# Patient Record
Sex: MALE | Race: WHITE | NOT HISPANIC OR LATINO | Employment: FULL TIME | URBAN - METROPOLITAN AREA
[De-identification: names, ages, dates, MRNs, and addresses within clinical notes are randomized per-mention and may not be internally consistent; named-entity substitution may affect disease eponyms.]

---

## 2021-10-06 NOTE — PROGRESS NOTES
Chief Complaint        Hemorrhoids    History of Present Illness      Julio Vines is a 56 y.o. male who presents to [facility] as a new patient as a referral from [facility].  Patient has a history of hemorrhoids, family history of colon cancer and anemia.  Patient reports he has struggled with hemorrhoids for over 15 years he did have cauterization of his hemorrhoids about 13 years ago he reports.  He has hemorrhoids returned about 8 years ago they are external causing irritation as well as internal.  He reports bleeding with bowel movements.  Patient reports 1 time of bleeding into the toilet bowl about 4 months ago.  He uses baby wipes to avoid irritation.  He reports 1 bowel movement a day that is soft in texture.  He has hydrating well.  Patient drinks alcohol about 4 beers a day.  He has family history of colon cancer in his sister that was diagnosed at age 47 and had to undergo surgery and chemo she is in remission now at age 49.  Patient presents today with the complaint of hemorrhoids.  Patient was noted to be anemic over the past year.  Patient denies fever, nausea, vomiting, weight loss, night sweats, melena, hematemesis.    Patient has never had an EGD.   Patient's last colonoscopy was in 2017 at [facility] with Dr. Gamboa for screening colonoscopy normal colon.       Results       Result Review :   The following data was reviewed by: Yajaira Olmstead NP on 10/07/2021         CBC    CBC 11/18/20 5/18/21   WBC 8.93 8.30   RBC 4.05 (A) 3.90 (A)   Hemoglobin 13.7 13.3 (A)   Hematocrit 40.7 (A) 38.9 (A)   .5 (A) 99.7   MCH 33.8 34.1 (A)   MCHC 33.7 34.2   RDW 13.1 13.1   Platelets 345 348   (A) Abnormal value                   Past Medical History       Past Medical History:   Diagnosis Date   • HBP (high blood pressure)        Past Surgical History:   Procedure Laterality Date   • COLONOSCOPY  2017    [facility]         Current  "Outpatient Medications:   •  ferrous sulfate 325 (65 FE) MG tablet, Take 325 mg by mouth Daily With Breakfast., Disp: , Rfl:   •  Lysine 1000 MG tablet, Take  by mouth., Disp: , Rfl:   •  multivitamin with minerals tablet tablet, Take 1 tablet by mouth Daily., Disp: , Rfl:   •  olmesartan-hydrochlorothiazide (BENICAR HCT) 40-12.5 MG per tablet, Take 1 tablet by mouth Daily., Disp: , Rfl:   •  Omega-3 Fatty Acids (fish oil) 1000 MG capsule capsule, Take 1 capsule by mouth Daily., Disp: , Rfl:   •  Potassium 75 MG tablet, Take  by mouth., Disp: , Rfl:   •  hydrocortisone (ANUSOL-HC) 25 MG suppository, Insert 1 suppository into the rectum 2 (Two) Times a Day., Disp: 24 suppository, Rfl: 2  •  polyethylene glycol (GoLYTELY) 236 g solution, Instructions provided by the office. Colon prep., Disp: 4000 mL, Rfl: 0     No Known Allergies    Family History   Problem Relation Age of Onset   • Colon cancer Sister         Social History     Social History Narrative   • Not on file       Objective       Objective     Vital Signs:   /85 (BP Location: Right arm, Patient Position: Sitting, Cuff Size: Adult)   Pulse 78   Ht 180.3 cm (71\")   Wt 79 kg (174 lb 3.2 oz)   SpO2 100%   BMI 24.30 kg/m²     Body mass index is 24.3 kg/m².    Physical Exam  Constitutional:       General: He is not in acute distress.     Appearance: Normal appearance. He is well-developed and normal weight.   Eyes:      Conjunctiva/sclera: Conjunctivae normal.      Pupils: Pupils are equal, round, and reactive to light.      Visual Fields: Right eye visual fields normal and left eye visual fields normal.   Cardiovascular:      Rate and Rhythm: Normal rate and regular rhythm.      Heart sounds: Normal heart sounds.   Pulmonary:      Effort: Pulmonary effort is normal. No retractions.      Breath sounds: Normal breath sounds and air entry.      Comments: Inspection of chest: normal appearance  Abdominal:      General: Bowel sounds are normal.      " Palpations: Abdomen is soft.      Tenderness: There is no abdominal tenderness.      Comments: No appreciable hepatosplenomegaly   Musculoskeletal:      Cervical back: Neck supple.      Right lower leg: No edema.      Left lower leg: No edema.   Lymphadenopathy:      Cervical: No cervical adenopathy.   Skin:     Findings: No lesion.      Comments: Turgor normal   Neurological:      Mental Status: He is alert and oriented to person, place, and time.   Psychiatric:         Mood and Affect: Mood and affect normal.              Assessment & Plan          Assessment and Plan    Diagnoses and all orders for this visit:    1. Hemorrhoids, unspecified hemorrhoid type (Primary)  -     CBC & Differential  -     Iron Profile  -     Vitamin B12 & Folate    2. Rectal bleeding  -     CBC & Differential  -     Iron Profile  -     Vitamin B12 & Folate    3. Family history of colon cancer  -     CBC & Differential  -     Iron Profile  -     Vitamin B12 & Folate    4. Anemia, unspecified type  -     CBC & Differential  -     Iron Profile  -     Vitamin B12 & Folate    5. ETOH abuse  -     CBC & Differential  -     Iron Profile  -     Vitamin B12 & Folate    6. Pre-procedural examination  -     COVID PRE-OP / PRE-PROCEDURE SCREENING ORDER (NO ISOLATION) - Swab, Nasopharynx; Future    Other orders  -     hydrocortisone (ANUSOL-HC) 25 MG suppository; Insert 1 suppository into the rectum 2 (Two) Times a Day.  Dispense: 24 suppository; Refill: 2    56-year-old male presenting to the office today with a history of anemia, rectal bleeding, hemorrhoids, rectal bleeding, alcohol use and family history of colon cancer in his sister at age 47.  The patient symptoms of rectal bleeding, anemia hemorrhoids and family history I have recommended that the patient undergo further evaluation with a colonoscopy.  I have discussed this procedure in detail with the patient.  I have discussed the risks, benefits and alternatives.  I have discussed the risk  of anesthesia, bleeding and perforation.  Patient understands these risks, benefits and alternatives and wishes to proceed.  I will schedule him at his earliest convenience.  I have prescribed Anusol HC suppositories to see if this helps with his hemorrhoids and rectal bleeding.  I have recommended patient discontinue alcohol use.  I have ordered labs to include CBC, iron, vitamin B12 and folate.  Patient is agreeable to this plan and will follow up after endoscopy.              Follow Up       Follow Up   Return for Follow up after endoscopy in office.  Patient was given instructions and counseling regarding his condition or for health maintenance advice. Please see specific information pulled into the AVS if appropriate.

## 2021-10-07 ENCOUNTER — LAB (OUTPATIENT)
Dept: LAB | Facility: HOSPITAL | Age: 56
End: 2021-10-07

## 2021-10-07 ENCOUNTER — OFFICE VISIT (OUTPATIENT)
Dept: GASTROENTEROLOGY | Facility: CLINIC | Age: 56
End: 2021-10-07

## 2021-10-07 ENCOUNTER — PREP FOR SURGERY (OUTPATIENT)
Dept: OTHER | Facility: HOSPITAL | Age: 56
End: 2021-10-07

## 2021-10-07 VITALS
WEIGHT: 174.2 LBS | DIASTOLIC BLOOD PRESSURE: 85 MMHG | OXYGEN SATURATION: 100 % | SYSTOLIC BLOOD PRESSURE: 150 MMHG | BODY MASS INDEX: 24.39 KG/M2 | HEIGHT: 71 IN | HEART RATE: 78 BPM

## 2021-10-07 DIAGNOSIS — K64.9 HEMORRHOIDS: Primary | ICD-10-CM

## 2021-10-07 DIAGNOSIS — K62.5 RECTAL BLEEDING: ICD-10-CM

## 2021-10-07 DIAGNOSIS — Z80.0 FAMILY HISTORY OF COLON CANCER: ICD-10-CM

## 2021-10-07 DIAGNOSIS — Z01.818 PRE-PROCEDURAL EXAMINATION: ICD-10-CM

## 2021-10-07 DIAGNOSIS — F10.10 ETOH ABUSE: ICD-10-CM

## 2021-10-07 DIAGNOSIS — D64.9 ANEMIA, UNSPECIFIED TYPE: ICD-10-CM

## 2021-10-07 DIAGNOSIS — K64.9 HEMORRHOIDS, UNSPECIFIED HEMORRHOID TYPE: Primary | ICD-10-CM

## 2021-10-07 LAB
BASOPHILS # BLD AUTO: 0.13 10*3/MM3 (ref 0–0.2)
BASOPHILS NFR BLD AUTO: 2.1 % (ref 0–1.5)
DEPRECATED RDW RBC AUTO: 50.3 FL (ref 37–54)
EOSINOPHIL # BLD AUTO: 0.18 10*3/MM3 (ref 0–0.4)
EOSINOPHIL NFR BLD AUTO: 2.9 % (ref 0.3–6.2)
ERYTHROCYTE [DISTWIDTH] IN BLOOD BY AUTOMATED COUNT: 13 % (ref 12.3–15.4)
FOLATE SERPL-MCNC: 15.5 NG/ML (ref 4.78–24.2)
HCT VFR BLD AUTO: 42.8 % (ref 37.5–51)
HGB BLD-MCNC: 14.3 G/DL (ref 13–17.7)
IMM GRANULOCYTES # BLD AUTO: 0.01 10*3/MM3 (ref 0–0.05)
IMM GRANULOCYTES NFR BLD AUTO: 0.2 % (ref 0–0.5)
IRON 24H UR-MRATE: 187 MCG/DL (ref 59–158)
IRON SATN MFR SERPL: 56 % (ref 20–50)
LYMPHOCYTES # BLD AUTO: 1.61 10*3/MM3 (ref 0.7–3.1)
LYMPHOCYTES NFR BLD AUTO: 25.7 % (ref 19.6–45.3)
MCH RBC QN AUTO: 34.8 PG (ref 26.6–33)
MCHC RBC AUTO-ENTMCNC: 33.4 G/DL (ref 31.5–35.7)
MCV RBC AUTO: 104.1 FL (ref 79–97)
MONOCYTES # BLD AUTO: 0.81 10*3/MM3 (ref 0.1–0.9)
MONOCYTES NFR BLD AUTO: 12.9 % (ref 5–12)
NEUTROPHILS NFR BLD AUTO: 3.52 10*3/MM3 (ref 1.7–7)
NEUTROPHILS NFR BLD AUTO: 56.2 % (ref 42.7–76)
NRBC BLD AUTO-RTO: 0 /100 WBC (ref 0–0.2)
PLATELET # BLD AUTO: 334 10*3/MM3 (ref 140–450)
PMV BLD AUTO: 9.9 FL (ref 6–12)
RBC # BLD AUTO: 4.11 10*6/MM3 (ref 4.14–5.8)
TIBC SERPL-MCNC: 332 MCG/DL (ref 298–536)
TRANSFERRIN SERPL-MCNC: 223 MG/DL (ref 200–360)
VIT B12 BLD-MCNC: 170 PG/ML (ref 211–946)
WBC # BLD AUTO: 6.26 10*3/MM3 (ref 3.4–10.8)

## 2021-10-07 PROCEDURE — 99204 OFFICE O/P NEW MOD 45 MIN: CPT | Performed by: NURSE PRACTITIONER

## 2021-10-07 PROCEDURE — 85025 COMPLETE CBC W/AUTO DIFF WBC: CPT | Performed by: NURSE PRACTITIONER

## 2021-10-07 PROCEDURE — 82607 VITAMIN B-12: CPT | Performed by: NURSE PRACTITIONER

## 2021-10-07 PROCEDURE — 84466 ASSAY OF TRANSFERRIN: CPT | Performed by: NURSE PRACTITIONER

## 2021-10-07 PROCEDURE — 82746 ASSAY OF FOLIC ACID SERUM: CPT | Performed by: NURSE PRACTITIONER

## 2021-10-07 PROCEDURE — 83540 ASSAY OF IRON: CPT | Performed by: NURSE PRACTITIONER

## 2021-10-07 RX ORDER — FERROUS SULFATE 325(65) MG
325 TABLET ORAL
COMMUNITY

## 2021-10-07 RX ORDER — MULTIPLE VITAMINS W/ MINERALS TAB 9MG-400MCG
1 TAB ORAL DAILY
Status: ON HOLD | COMMUNITY
End: 2022-12-19

## 2021-10-07 RX ORDER — HYDROCORTISONE ACETATE 25 MG/1
25 SUPPOSITORY RECTAL 2 TIMES DAILY
Qty: 24 SUPPOSITORY | Refills: 2 | Status: SHIPPED | OUTPATIENT
Start: 2021-10-07

## 2021-10-07 RX ORDER — CHLORAL HYDRATE 500 MG
1 CAPSULE ORAL DAILY
COMMUNITY

## 2021-10-07 RX ORDER — OLMESARTAN MEDOXOMIL AND HYDROCHLOROTHIAZIDE 40/12.5 40; 12.5 MG/1; MG/1
1 TABLET ORAL DAILY
COMMUNITY
Start: 2021-08-06

## 2021-10-07 NOTE — PATIENT INSTRUCTIONS
Hemorrhoids  Hemorrhoids are swollen veins in and around the rectum or anus. There are two types of hemorrhoids:  · Internal hemorrhoids. These occur in the veins that are just inside the rectum. They may poke through to the outside and become irritated and painful.  · External hemorrhoids. These occur in the veins that are outside the anus and can be felt as a painful swelling or hard lump near the anus.  Most hemorrhoids do not cause serious problems, and they can be managed with home treatments such as diet and lifestyle changes. If home treatments do not help the symptoms, procedures can be done to shrink or remove the hemorrhoids.  What are the causes?  This condition is caused by increased pressure in the anal area. This pressure may result from various things, including:  · Constipation.  · Straining to have a bowel movement.  · Diarrhea.  · Pregnancy.  · Obesity.  · Sitting for long periods of time.  · Heavy lifting or other activity that causes you to strain.  · Anal sex.  · Riding a bike for a long period of time.  What are the signs or symptoms?  Symptoms of this condition include:  · Pain.  · Anal itching or irritation.  · Rectal bleeding.  · Leakage of stool (feces).  · Anal swelling.  · One or more lumps around the anus.  How is this diagnosed?  This condition can often be diagnosed through a visual exam. Other exams or tests may also be done, such as:  · An exam that involves feeling the rectal area with a gloved hand (digital rectal exam).  · An exam of the anal canal that is done using a small tube (anoscope).  · A blood test, if you have lost a significant amount of blood.  · A test to look inside the colon using a flexible tube with a camera on the end (sigmoidoscopy or colonoscopy).  How is this treated?  This condition can usually be treated at home. However, various procedures may be done if dietary changes, lifestyle changes, and other home treatments do not help your symptoms. These  procedures can help make the hemorrhoids smaller or remove them completely. Some of these procedures involve surgery, and others do not. Common procedures include:  · Rubber band ligation. Rubber bands are placed at the base of the hemorrhoids to cut off their blood supply.  · Sclerotherapy. Medicine is injected into the hemorrhoids to shrink them.  · Infrared coagulation. A type of light energy is used to get rid of the hemorrhoids.  · Hemorrhoidectomy surgery. The hemorrhoids are surgically removed, and the veins that supply them are tied off.  · Stapled hemorrhoidopexy surgery. The surgeon staples the base of the hemorrhoid to the rectal wall.  Follow these instructions at home:  Eating and drinking    · Eat foods that have a lot of fiber in them, such as whole grains, beans, nuts, fruits, and vegetables.  · Ask your health care provider about taking products that have added fiber (fiber supplements).  · Reduce the amount of fat in your diet. You can do this by eating low-fat dairy products, eating less red meat, and avoiding processed foods.  · Drink enough fluid to keep your urine pale yellow.    Managing pain and swelling    · Take warm sitz baths for 20 minutes, 3-4 times a day to ease pain and discomfort. You may do this in a bathtub or using a portable sitz bath that fits over the toilet.  · If directed, apply ice to the affected area. Using ice packs between sitz baths may be helpful.  ? Put ice in a plastic bag.  ? Place a towel between your skin and the bag.  ? Leave the ice on for 20 minutes, 2-3 times a day.    General instructions  · Take over-the-counter and prescription medicines only as told by your health care provider.  · Use medicated creams or suppositories as told.  · Get regular exercise. Ask your health care provider how much and what kind of exercise is best for you. In general, you should do moderate exercise for at least 30 minutes on most days of the week (150 minutes each week). This  can include activities such as walking, biking, or yoga.  · Go to the bathroom when you have the urge to have a bowel movement. Do not wait.  · Avoid straining to have bowel movements.  · Keep the anal area dry and clean. Use wet toilet paper or moist towelettes after a bowel movement.  · Do not sit on the toilet for long periods of time. This increases blood pooling and pain.  · Keep all follow-up visits as told by your health care provider. This is important.  Contact a health care provider if you have:  · Increasing pain and swelling that are not controlled by treatment or medicine.  · Difficulty having a bowel movement, or you are unable to have a bowel movement.  · Pain or inflammation outside the area of the hemorrhoids.  Get help right away if you have:  · Uncontrolled bleeding from your rectum.  Summary  · Hemorrhoids are swollen veins in and around the rectum or anus.  · Most hemorrhoids can be managed with home treatments such as diet and lifestyle changes.  · Taking warm sitz baths can help ease pain and discomfort.  · In severe cases, procedures or surgery can be done to shrink or remove the hemorrhoids.  This information is not intended to replace advice given to you by your health care provider. Make sure you discuss any questions you have with your health care provider.  Document Revised: 05/15/2020 Document Reviewed: 05/09/2019  Elsevier Patient Education © 2021 [employer]

## 2021-10-13 ENCOUNTER — PATIENT ROUNDING (BHMG ONLY) (OUTPATIENT)
Dept: GASTROENTEROLOGY | Facility: CLINIC | Age: 56
End: 2021-10-13

## 2021-10-13 NOTE — PROGRESS NOTES
October 13, 2021    Hello, may I speak with Julio Vines?    My name is Toyin Rahman     I am  with [facility]  [address] [place] [address] [phone].    Before we get started may I verify your date of birth? 1965    I am calling to officially welcome you to our practice and ask about your recent visit. Is this a good time to talk? Yes     Tell me about your visit with us. What things went well? I had a good visit, it was great       We're always looking for ways to make our patients' experiences even better. Do you have recommendations on ways we may improve?  No    Overall were you satisfied with your first visit to our practice? Yes        I appreciate you taking the time to speak with me today. Is there anything else I can do for you? No it was a good visit      Thank you, and have a great day.

## 2021-12-09 ENCOUNTER — TELEPHONE (OUTPATIENT)
Dept: GASTROENTEROLOGY | Facility: CLINIC | Age: 56
End: 2021-12-09

## 2021-12-09 NOTE — TELEPHONE ENCOUNTER
I received a email from [facility] that Mr Vines did NOT have his covid swab as scheduled and/or not covid vacc. He is sched for egd/colon with dr duncan 12/13/2021. Per dr duncan, he needs to resched.    I left a detailed vm of this on him phone (ok per REBECCA)   anand

## 2021-12-10 NOTE — TELEPHONE ENCOUNTER
I have left a detailed message about calling office to resched his scope that is on for Monday 12/13/2021. He has not had a covid swab. I have cancelled his procedure.  Eddie

## 2022-08-31 ENCOUNTER — PREP FOR SURGERY (OUTPATIENT)
Dept: OTHER | Facility: HOSPITAL | Age: 57
End: 2022-08-31

## 2022-08-31 ENCOUNTER — OFFICE VISIT (OUTPATIENT)
Dept: SURGERY | Facility: CLINIC | Age: 57
End: 2022-08-31

## 2022-08-31 VITALS — WEIGHT: 176 LBS | BODY MASS INDEX: 24.64 KG/M2 | HEIGHT: 71 IN | HEART RATE: 78 BPM

## 2022-08-31 DIAGNOSIS — Z12.11 SCREENING FOR MALIGNANT NEOPLASM OF COLON: Primary | ICD-10-CM

## 2022-08-31 DIAGNOSIS — Z80.0 FAMILY HISTORY OF COLON CANCER: ICD-10-CM

## 2022-08-31 PROCEDURE — S0285 CNSLT BEFORE SCREEN COLONOSC: HCPCS | Performed by: NURSE PRACTITIONER

## 2022-08-31 RX ORDER — SILDENAFIL 50 MG/1
TABLET, FILM COATED ORAL
COMMUNITY
Start: 2022-07-14

## 2022-08-31 RX ORDER — POLYETHYLENE GLYCOL 3350 17 G/17G
POWDER, FOR SOLUTION ORAL
Qty: 238 PACKET | Refills: 0 | Status: ON HOLD | OUTPATIENT
Start: 2022-08-31 | End: 2022-12-19

## 2022-08-31 RX ORDER — AMOXICILLIN 875 MG/1
875 TABLET, COATED ORAL 2 TIMES DAILY
Status: ON HOLD | COMMUNITY
Start: 2022-08-17 | End: 2022-12-19

## 2022-08-31 RX ORDER — HYDROCORTISONE ACETATE PRAMOXINE HCL 1; 1 G/100G; G/100G
CREAM TOPICAL 2 TIMES DAILY
Qty: 30 G | Refills: 0 | Status: SHIPPED | OUTPATIENT
Start: 2022-08-31

## 2022-08-31 NOTE — PROGRESS NOTES
Chief Complaint: Colonoscopy (consult)    Subjective      Colonoscopy consultation       History of Present Illness  Julio Vines is a 57 y.o. male presents to [facility] for colonoscopy consultation.    Patient presents today on referral from Juana Schuler for colonoscopy consultation.    Patient denies any abdominal pain, change in bowel habit, or rectal bleeding.    Patient reports that he is having trouble with his hemorrhoids, last time he seen rectal bleeding was about 8 months ago.    Admits to family history of colon cancer with his sister.    Patient would like to have his hemorrhoids evaluated today, and to possibly discuss surgical removal.    7/17: Colonoscopy (NASREEN Gamboa): normal colon    Objective     Past Medical History:   Diagnosis Date   • HBP (high blood pressure)        Past Surgical History:   Procedure Laterality Date   • COLONOSCOPY  2017    [facility]       Outpatient Medications Marked as Taking for the 8/31/22 encounter (Office Visit) with Gillian Lorenzo APRN   Medication Sig Dispense Refill   • amoxicillin (AMOXIL) 875 MG tablet Take 875 mg by mouth 2 (Two) Times a Day.     • ferrous sulfate 325 (65 FE) MG tablet Take 325 mg by mouth Daily With Breakfast.     • olmesartan-hydrochlorothiazide (BENICAR HCT) 40-12.5 MG per tablet Take 1 tablet by mouth Daily.     • Omega-3 Fatty Acids (fish oil) 1000 MG capsule capsule Take 1 capsule by mouth Daily.     • vitamin B-12 (CYANOCOBALAMIN) 100 MCG tablet Take 50 mcg by mouth Daily.         No Known Allergies     Family History   Problem Relation Age of Onset   • Colon cancer Sister    • Malig Hyperthermia Neg Hx        Social History     Socioeconomic History   • Marital status:    Tobacco Use   • Smoking status: Current Every Day Smoker     Packs/day: 0.25     Types: Cigarettes   • Smokeless tobacco: Never Used   • Tobacco comment: started smoking at age 20, smoked cigs 5 per day   "  Vaping Use   • Vaping Use: Never used   Substance and Sexual Activity   • Alcohol use: Yes     Comment: drinks weekly, beer    • Drug use: Never   • Sexual activity: Defer       Review of Systems   Constitutional: Negative for chills and fever.   Gastrointestinal: Negative for abdominal distention, abdominal pain, anal bleeding, blood in stool, constipation, diarrhea and rectal pain.        Vital Signs:   Pulse 78   Ht 180.3 cm (71\")   Wt 79.8 kg (176 lb)   BMI 24.55 kg/m²      Physical Exam  Vitals and nursing note reviewed.   Constitutional:       General: He is not in acute distress.     Appearance: Normal appearance.   HENT:      Head: Normocephalic.   Cardiovascular:      Rate and Rhythm: Normal rate.   Pulmonary:      Effort: Pulmonary effort is normal.      Breath sounds: No stridor. No wheezing.   Abdominal:      General: Abdomen is flat.      Palpations: Abdomen is soft. There is no mass.      Tenderness: There is no guarding.   Musculoskeletal:         General: No deformity. Normal range of motion.   Skin:     General: Skin is warm and dry.      Coloration: Skin is not jaundiced.   Neurological:      General: No focal deficit present.      Mental Status: He is alert and oriented to person, place, and time.   Psychiatric:         Mood and Affect: Mood normal.         Thought Content: Thought content normal.          Result Review :          []  Laboratory  []  Radiology  []  Pathology  []  Microbiology  []  EKG/Telemetry   []  Cardiology/Vascular   [x]  Old records  Today I reviewed Cheikh Gamboa's previous colonoscopy.     Assessment and Plan    Diagnoses and all orders for this visit:    1. Screening for malignant neoplasm of colon (Primary)    2. Family history of colon cancer    Other orders  -     polyethylene glycol (MIRALAX) 17 g packet; Take as directed.  Instructions given in office.  Dispense: 238 g bottle  Dispense: 238 packet; Refill: 0  -     Hydrocortisone Ace-Pramoxine 1-1 % rectal " cream; Insert  into the rectum 2 (Two) Times a Day.  Dispense: 30 g; Refill: 0        Follow Up   Return for Schedule colonoscopy with Dr. Snider on 12/19/2022 at [facility].     Hospital arrival time: 0800.    Possible risks/complications, benefits, and alternatives to surgical or invasive procedure have been explained to patient and/or legal guardian.     Patient has been evaluated and can tolerate anesthesia and/or sedation. Risks, benefits, and alternatives to anesthesia and sedation have been explained to patient and/or legal guardian.  Patient verbalizes understanding and is will proceed with above plan.    Patient was given instructions and counseling regarding his condition or for health maintenance advice. Please see specific information pulled into the AVS if appropriate.

## 2022-12-19 ENCOUNTER — ANESTHESIA (OUTPATIENT)
Dept: GASTROENTEROLOGY | Facility: HOSPITAL | Age: 57
End: 2022-12-19

## 2022-12-19 ENCOUNTER — HOSPITAL ENCOUNTER (OUTPATIENT)
Facility: HOSPITAL | Age: 57
Setting detail: HOSPITAL OUTPATIENT SURGERY
Discharge: HOME | End: 2022-12-19
Attending: SURGERY | Admitting: SURGERY

## 2022-12-19 ENCOUNTER — ANESTHESIA EVENT (OUTPATIENT)
Dept: GASTROENTEROLOGY | Facility: HOSPITAL | Age: 57
End: 2022-12-19

## 2022-12-19 VITALS
DIASTOLIC BLOOD PRESSURE: 86 MMHG | TEMPERATURE: 98 F | RESPIRATION RATE: 16 BRPM | OXYGEN SATURATION: 99 % | BODY MASS INDEX: 23.74 KG/M2 | WEIGHT: 170.19 LBS | SYSTOLIC BLOOD PRESSURE: 120 MMHG | HEART RATE: 79 BPM

## 2022-12-19 PROCEDURE — 25010000002 PROPOFOL 10 MG/ML EMULSION: Performed by: NURSE ANESTHETIST, CERTIFIED REGISTERED

## 2022-12-19 RX ORDER — SODIUM CHLORIDE, SODIUM LACTATE, POTASSIUM CHLORIDE, CALCIUM CHLORIDE 600; 310; 30; 20 MG/100ML; MG/100ML; MG/100ML; MG/100ML
30 INJECTION, SOLUTION INTRAVENOUS CONTINUOUS
Status: DISCONTINUED | OUTPATIENT
Start: 2022-12-19 | End: 2022-12-19 | Stop reason: HOSPADM

## 2022-12-19 RX ORDER — ONDANSETRON 2 MG/ML
4 INJECTION INTRAMUSCULAR; INTRAVENOUS ONCE AS NEEDED
Status: DISCONTINUED | OUTPATIENT
Start: 2022-12-19 | End: 2022-12-19 | Stop reason: HOSPADM

## 2022-12-19 RX ORDER — ONDANSETRON 4 MG/1
4 TABLET, FILM COATED ORAL ONCE AS NEEDED
Status: DISCONTINUED | OUTPATIENT
Start: 2022-12-19 | End: 2022-12-19 | Stop reason: HOSPADM

## 2022-12-19 RX ORDER — LIDOCAINE HYDROCHLORIDE 20 MG/ML
INJECTION, SOLUTION EPIDURAL; INFILTRATION; INTRACAUDAL; PERINEURAL AS NEEDED
Status: DISCONTINUED | OUTPATIENT
Start: 2022-12-19 | End: 2022-12-19 | Stop reason: SURG

## 2022-12-19 RX ORDER — PROPOFOL 10 MG/ML
VIAL (ML) INTRAVENOUS AS NEEDED
Status: DISCONTINUED | OUTPATIENT
Start: 2022-12-19 | End: 2022-12-19 | Stop reason: SURG

## 2022-12-19 RX ADMIN — PROPOFOL 250 MCG/KG/MIN: 10 INJECTION, EMULSION INTRAVENOUS at 09:54

## 2022-12-19 RX ADMIN — SODIUM CHLORIDE, POTASSIUM CHLORIDE, SODIUM LACTATE AND CALCIUM CHLORIDE 30 ML/HR: 600; 310; 30; 20 INJECTION, SOLUTION INTRAVENOUS at 09:13

## 2022-12-19 RX ADMIN — LIDOCAINE HYDROCHLORIDE 100 MG: 20 INJECTION, SOLUTION EPIDURAL; INFILTRATION; INTRACAUDAL; PERINEURAL at 09:54

## 2022-12-19 RX ADMIN — PROPOFOL 50 MG: 10 INJECTION, EMULSION INTRAVENOUS at 09:54

## 2022-12-19 NOTE — H&P
[facility]   HISTORY AND PHYSICAL    Patient Name: Julio Vines  : 1965  MRN: [id number]  Primary Care Physician:  Ivis Gonzalez APRN  Date of admission: 2022    Subjective   Subjective     Chief Complaint: Colon screening    HPI:    Julio Vines is a 57 y.o. male who presents for colon screening.  He denies any rectal bleeding.    Review of Systems   Respiratory: Negative for shortness of breath.    Cardiovascular: Negative for chest pain.   Gastrointestinal: Negative for abdominal pain and blood in stool.       Personal History     Past Medical History:   Diagnosis Date   • HBP (high blood pressure)        Past Surgical History:   Procedure Laterality Date   • COLONOSCOPY      [facility]       Family History: family history includes Colon cancer in his sister. Otherwise pertinent FHx was reviewed and not pertinent to current issue.    Social History:  reports that he has been smoking cigarettes. He has been smoking an average of .25 packs per day. He has never used smokeless tobacco. He reports current alcohol use. He reports that he does not use drugs.    Home Medications:  Hydrocortisone Ace-Pramoxine, amoxicillin, ferrous sulfate, fish oil, hydrocortisone, multivitamin with minerals, olmesartan-hydrochlorothiazide, polyethylene glycol, and sildenafil    Allergies:  No Known Allergies    Objective    Objective     Vitals:   Temp:  [98.3 °F (36.8 °C)] 98.3 °F (36.8 °C)  Heart Rate:  [78] 78  Resp:  [18] 18  BP: (134)/(91) 134/91    Physical Exam  Constitutional:       Appearance: Normal appearance.   HENT:      Head: Normocephalic and atraumatic.   Cardiovascular:      Rate and Rhythm: Normal rate.   Pulmonary:      Effort: Pulmonary effort is normal.   Abdominal:      Palpations: Abdomen is soft.   Musculoskeletal:         General: Normal range of motion.      Cervical back: Normal range of motion.   Skin:     General: Skin is warm.   Neurological:      General:  No focal deficit present.      Mental Status: He is alert.   Psychiatric:         Mood and Affect: Mood normal.         Result Review    Result Review:  I have personally reviewed the results from the time of this admission to 12/19/2022 09:01 EST and agree with these findings:  []  Laboratory  []  Microbiology  []  Radiology  []  EKG/Telemetry   []  Cardiology/Vascular   []  Pathology  []  Old records  []  Other:  Most notable findings include:     Assessment & Plan   Assessment / Plan     Brief Patient Summary:  Julio Vines is a 57 y.o. male who presents for colon cancer screening.    Active Hospital Problems:  Active Hospital Problems    Diagnosis    • Screening for malignant neoplasm of colon    • Family history of colon cancer        Plan:   We will proceed with a colonoscopy.  Risk benefits and alternatives were explained.    DVT prophylaxis:  No DVT prophylaxis order currently exists.    CODE STATUS:         Admission Status:  I believe this patient meets outpatient status.    Electronically signed by Jayant Snider MD, 12/19/22, 9:01 AM EST.

## 2022-12-19 NOTE — ANESTHESIA PREPROCEDURE EVALUATION
Anesthesia Evaluation     Patient summary reviewed and Nursing notes reviewed   no history of anesthetic complications:  NPO Solid Status: > 8 hours  NPO Liquid Status: > 2 hours           Airway   Mallampati: II  TM distance: >3 FB  Neck ROM: full  No difficulty expected  Dental      Pulmonary - negative pulmonary ROS and normal exam    breath sounds clear to auscultation  Cardiovascular - normal exam  Exercise tolerance: good (4-7 METS)    Rhythm: regular  Rate: normal    (+) hypertension well controlled,       Neuro/Psych- negative ROS  GI/Hepatic/Renal/Endo    (+)  GI bleeding resolved,     Musculoskeletal (-) negative ROS    Abdominal    Substance History - negative use     OB/GYN negative ob/gyn ROS         Other - negative ROS       ROS/Med Hx Other: PAT Nursing Notes unavailable.                   Anesthesia Plan    ASA 2     general     (Total IV Anesthesia    Patient understands anesthesia not responsible for dental damage.  )  intravenous induction     Anesthetic plan, risks, benefits, and alternatives have been provided, discussed and informed consent has been obtained with: patient.  Pre-procedure education provided  Plan discussed with CRNA.        CODE STATUS:

## 2022-12-19 NOTE — ANESTHESIA POSTPROCEDURE EVALUATION
Patient: Julio Vines    Procedure Summary     Date: 12/19/22 Room / Location: [facility] ENDOSCOPY 1 / [facility] ENDOSCOPY    Anesthesia Start: 0952 Anesthesia Stop: 1021    Procedure: COLONOSCOPY Diagnosis:       Screening for malignant neoplasm of colon      Family history of colon cancer      (Screening for malignant neoplasm of colon [Z12.11])      (Family history of colon cancer [Z80.0])    Surgeons: Jayant Snider MD Provider: Yoni Kramer MD    Anesthesia Type: general ASA Status: 2          Anesthesia Type: general    Vitals  Vitals Value Taken Time   /82 12/19/22 1030   Temp 36.7 °C (98 °F) 12/19/22 1020   Pulse 71 12/19/22 1030   Resp 13 12/19/22 1030   SpO2 99 % 12/19/22 1030           Post Anesthesia Care and Evaluation    Patient location during evaluation: bedside  Patient participation: complete - patient participated  Level of consciousness: awake and alert  Pain management: adequate    Airway patency: patent  Anesthetic complications: No anesthetic complications  PONV Status: none  Cardiovascular status: acceptable  Respiratory status: acceptable  Hydration status: acceptable    Comments: An Anesthesiologist personally participated in the most demanding procedures (including induction and emergence if applicable) in the anesthesia plan, monitored the course of anesthesia administration at frequent intervals and remained physically present and available for immediate diagnosis and treatment of emergencies.

## 2023-08-16 ENCOUNTER — OFFICE VISIT (OUTPATIENT)
Dept: FAMILY MEDICINE CLINIC | Facility: CLINIC | Age: 58
End: 2023-08-16
Payer: COMMERCIAL

## 2023-08-16 VITALS
HEART RATE: 68 BPM | WEIGHT: 173 LBS | OXYGEN SATURATION: 99 % | HEIGHT: 71 IN | TEMPERATURE: 98.6 F | DIASTOLIC BLOOD PRESSURE: 80 MMHG | SYSTOLIC BLOOD PRESSURE: 124 MMHG | BODY MASS INDEX: 24.22 KG/M2

## 2023-08-16 DIAGNOSIS — I10 PRIMARY HYPERTENSION: ICD-10-CM

## 2023-08-16 DIAGNOSIS — Z00.00 ENCOUNTER FOR MEDICAL EXAMINATION TO ESTABLISH CARE: Primary | ICD-10-CM

## 2023-08-16 DIAGNOSIS — E53.8 B12 DEFICIENCY: ICD-10-CM

## 2023-08-16 DIAGNOSIS — Z00.00 ANNUAL PHYSICAL EXAM: ICD-10-CM

## 2023-08-16 DIAGNOSIS — E61.1 IRON DEFICIENCY: ICD-10-CM

## 2023-08-16 LAB
ALBUMIN SERPL-MCNC: 4.6 G/DL (ref 3.5–5.2)
ALBUMIN/GLOB SERPL: 2.2 G/DL
ALP SERPL-CCNC: 82 U/L (ref 39–117)
ALT SERPL W P-5'-P-CCNC: 26 U/L (ref 1–41)
ANION GAP SERPL CALCULATED.3IONS-SCNC: 11 MMOL/L (ref 5–15)
AST SERPL-CCNC: 23 U/L (ref 1–40)
BASOPHILS # BLD AUTO: 0.12 10*3/MM3 (ref 0–0.2)
BASOPHILS NFR BLD AUTO: 1.6 % (ref 0–1.5)
BILIRUB SERPL-MCNC: 0.2 MG/DL (ref 0–1.2)
BUN SERPL-MCNC: 18 MG/DL (ref 6–20)
BUN/CREAT SERPL: 18.9 (ref 7–25)
CALCIUM SPEC-SCNC: 9.6 MG/DL (ref 8.6–10.5)
CHLORIDE SERPL-SCNC: 103 MMOL/L (ref 98–107)
CHOLEST SERPL-MCNC: 177 MG/DL (ref 0–200)
CO2 SERPL-SCNC: 26 MMOL/L (ref 22–29)
CREAT SERPL-MCNC: 0.95 MG/DL (ref 0.76–1.27)
DEPRECATED RDW RBC AUTO: 46.6 FL (ref 37–54)
EGFRCR SERPLBLD CKD-EPI 2021: 92.8 ML/MIN/1.73
EOSINOPHIL # BLD AUTO: 0.1 10*3/MM3 (ref 0–0.4)
EOSINOPHIL NFR BLD AUTO: 1.4 % (ref 0.3–6.2)
ERYTHROCYTE [DISTWIDTH] IN BLOOD BY AUTOMATED COUNT: 12.6 % (ref 12.3–15.4)
FERRITIN SERPL-MCNC: 697 NG/ML (ref 30–400)
FOLATE SERPL-MCNC: 18.6 NG/ML (ref 4.78–24.2)
GLOBULIN UR ELPH-MCNC: 2.1 GM/DL
GLUCOSE SERPL-MCNC: 89 MG/DL (ref 65–99)
HCT VFR BLD AUTO: 39.6 % (ref 37.5–51)
HDLC SERPL-MCNC: 49 MG/DL (ref 40–60)
HGB BLD-MCNC: 13.6 G/DL (ref 13–17.7)
IMM GRANULOCYTES # BLD AUTO: 0.01 10*3/MM3 (ref 0–0.05)
IMM GRANULOCYTES NFR BLD AUTO: 0.1 % (ref 0–0.5)
IRON 24H UR-MRATE: 205 MCG/DL (ref 59–158)
IRON SATN MFR SERPL: 68 % (ref 20–50)
LDLC SERPL CALC-MCNC: 114 MG/DL (ref 0–100)
LDLC/HDLC SERPL: 2.32 {RATIO}
LYMPHOCYTES # BLD AUTO: 1.9 10*3/MM3 (ref 0.7–3.1)
LYMPHOCYTES NFR BLD AUTO: 26 % (ref 19.6–45.3)
MCH RBC QN AUTO: 34.8 PG (ref 26.6–33)
MCHC RBC AUTO-ENTMCNC: 34.3 G/DL (ref 31.5–35.7)
MCV RBC AUTO: 101.3 FL (ref 79–97)
MONOCYTES # BLD AUTO: 0.85 10*3/MM3 (ref 0.1–0.9)
MONOCYTES NFR BLD AUTO: 11.6 % (ref 5–12)
NEUTROPHILS NFR BLD AUTO: 4.34 10*3/MM3 (ref 1.7–7)
NEUTROPHILS NFR BLD AUTO: 59.3 % (ref 42.7–76)
NRBC BLD AUTO-RTO: 0 /100 WBC (ref 0–0.2)
PLATELET # BLD AUTO: 347 10*3/MM3 (ref 140–450)
PMV BLD AUTO: 10.6 FL (ref 6–12)
POTASSIUM SERPL-SCNC: 4.7 MMOL/L (ref 3.5–5.2)
PROT SERPL-MCNC: 6.7 G/DL (ref 6–8.5)
RBC # BLD AUTO: 3.91 10*6/MM3 (ref 4.14–5.8)
SODIUM SERPL-SCNC: 140 MMOL/L (ref 136–145)
TIBC SERPL-MCNC: 302 MCG/DL (ref 298–536)
TRANSFERRIN SERPL-MCNC: 203 MG/DL (ref 200–360)
TRIGL SERPL-MCNC: 72 MG/DL (ref 0–150)
TSH SERPL DL<=0.05 MIU/L-ACNC: 1.2 UIU/ML (ref 0.27–4.2)
VIT B12 BLD-MCNC: >2000 PG/ML (ref 211–946)
VLDLC SERPL-MCNC: 14 MG/DL (ref 5–40)
WBC NRBC COR # BLD: 7.32 10*3/MM3 (ref 3.4–10.8)

## 2023-08-16 PROCEDURE — 82607 VITAMIN B-12: CPT

## 2023-08-16 PROCEDURE — 80053 COMPREHEN METABOLIC PANEL: CPT

## 2023-08-16 PROCEDURE — 80061 LIPID PANEL: CPT

## 2023-08-16 PROCEDURE — 84466 ASSAY OF TRANSFERRIN: CPT

## 2023-08-16 PROCEDURE — 83540 ASSAY OF IRON: CPT

## 2023-08-16 PROCEDURE — 82746 ASSAY OF FOLIC ACID SERUM: CPT

## 2023-08-16 PROCEDURE — 84443 ASSAY THYROID STIM HORMONE: CPT

## 2023-08-16 PROCEDURE — 85025 COMPLETE CBC W/AUTO DIFF WBC: CPT

## 2023-08-16 PROCEDURE — 82728 ASSAY OF FERRITIN: CPT

## 2023-08-16 RX ORDER — OLMESARTAN MEDOXOMIL AND HYDROCHLOROTHIAZIDE 20/12.5 20; 12.5 MG/1; MG/1
1 TABLET ORAL DAILY
Qty: 30 TABLET | Refills: 11 | Status: SHIPPED | OUTPATIENT
Start: 2023-08-16 | End: 2024-08-15

## 2023-08-16 NOTE — PROGRESS NOTES
Chief Complaint  Chief Complaint   Patient presents with    Establish Care    Hypertension       Subjective      Julio Vines presents to [facility] today to establish care. He reports a past medical history of hypertension, which he would like to discuss today.    His previous primary care physician was LILIANA De La Torre, at [facility] in the [facility]. He missed his 6-month follow-up appointment, and saw her 10 months ago. He has laboratory testing yearly.     The patient takes the Benicar as needed for hypertension. He stopped drinking alcohol and smoking cigarettes 11 days ago, and his blood pressure has improved. He takes his medication when it elevates. He was smoking 5 cigarettes daily and drinking about 6 beers daily for approximately 2 years. He checks his blood pressure once to twice daily and it averages from 135/80 mmHg to 140/80s mmHg. He took his blood pressure medication in the morning recently and 1 hour later, his blood pressure was 95/56 mmHg. He took his medication this morning because his blood pressure was 150/95 mmHg. He felt slightly dizzy when his blood pressure was low but not initially, and after he sat down for a significant amount of time, he felt better. He denies headaches, chest pain, blurry vision, or swelling. He exercises and walks frequently at work. The patient monitors his salt intake. He works second shift.    The patient consumed alcohol intermittently for several years, but not consistently or heavily until the last 2 years. He denies any withdrawal symptoms such as shaking, fatigue, or confusion. He has a goal to not resume alcohol consumption or smoking.     He takes over-the-counter iron and vitamin B12 supplements daily because his previously laboratory blood tests indicated that he was slightly anemic. He takes over-the-counter fish oil daily. He denies history of high cholesterol. He has history of herpes labialis and  takes lysine which prevents them.     The patient's brother has hypertension and had a myocardial infarction. His sister has breast cancer, a hiatal hernia, and had nodules on her thyroid that were biopsied. His other sister had colon cancer.     His last colonoscopy was in 2022. He declines interest in receiving a pneumonia vaccine today, 2023.     The patient has felt congested recently, but denies cough, shortness of breath, or fever.     Objective     Medical History:  Past Medical History:   Diagnosis Date    HBP (high blood pressure)      Past Surgical History:   Procedure Laterality Date    COLONOSCOPY      [facility]    COLONOSCOPY N/A 2022    Procedure: COLONOSCOPY;  Surgeon: Jayant Snider MD;  Location: [facility] ENDOSCOPY;  Service: General;  Laterality: N/A;  normal       Social History     Tobacco Use    Smoking status: Former     Packs/day: 0.25     Years: 30.00     Pack years: 7.50     Types: Cigarettes     Quit date: 2023     Years since quittin.0    Smokeless tobacco: Never    Tobacco comments:     started smoking at age 20, smoked cigs 5 per day    Vaping Use    Vaping Use: Never used   Substance Use Topics    Alcohol use: Not Currently     Comment: drinks weekly, beer     Drug use: Never     Family History   Problem Relation Age of Onset    Colon cancer Sister     Malig Hyperthermia Neg Hx        Medications:  Prior to Admission medications    Medication Sig Start Date End Date Taking? Authorizing Provider   ferrous sulfate 325 (65 FE) MG tablet Take 1 tablet by mouth Daily With Breakfast.   Yes Kat Moran MD   olmesartan-hydrochlorothiazide (BENICAR HCT) 40-12.5 MG per tablet Take 1 tablet by mouth Daily. 21  Yes Kat Moran MD   Omega-3 Fatty Acids (fish oil) 1000 MG capsule capsule Take 1 capsule by mouth Daily.   Yes Kat Moran MD   hydrocortisone (ANUSOL-HC) 25 MG suppository Insert 1 suppository into the rectum 2  "(Two) Times a Day.  Patient not taking: Reported on 8/16/2023 10/7/21   Yajaira Olmstead, LILIANA   Hydrocortisone Ace-Pramoxine 1-1 % rectal cream Insert  into the rectum 2 (Two) Times a Day.  Patient not taking: Reported on 8/16/2023 8/31/22   Gillian Lorenzo, LILIANA   sildenafil (VIAGRA) 50 MG tablet TAKE 1 TABLET BY MOUTH AS NEEDED FOR ERICTIL EDYSFUNCTION  Patient not taking: Reported on 8/16/2023 7/14/22   Provider, MD Kat        Allergies:   [place] [fish oil] and Tuna oil    Health Maintenance Due   Topic Date Due    ANNUAL PHYSICAL  Never done         Vital Signs:   /80   Pulse 68   Temp 98.6 øF (37 øC)   Ht 180.3 cm (71\")   Wt 78.5 kg (173 lb)   SpO2 99%   BMI 24.13 kg/mý     Wt Readings from Last 3 Encounters:   08/16/23 78.5 kg (173 lb)   12/19/22 77.2 kg (170 lb 3.1 oz)   08/31/22 79.8 kg (176 lb)     BP Readings from Last 3 Encounters:   08/16/23 124/80   12/19/22 120/86   01/30/22 137/74       BMI is within normal parameters. No other follow-up for BMI required.       Physical Exam  Vitals reviewed.   Constitutional:       Appearance: Normal appearance. He is well-developed.   HENT:      Head: Normocephalic and atraumatic.   Eyes:      Conjunctiva/sclera: Conjunctivae normal.      Pupils: Pupils are equal, round, and reactive to light.   Cardiovascular:      Rate and Rhythm: Normal rate and regular rhythm.      Heart sounds: No murmur heard.    No friction rub. No gallop.   Pulmonary:      Effort: Pulmonary effort is normal.      Breath sounds: Wheezing (Intermittent) present. No rhonchi.   Abdominal:      General: Bowel sounds are normal. There is no distension.      Palpations: Abdomen is soft.      Tenderness: There is no abdominal tenderness.   Skin:     General: Skin is warm and dry.   Neurological:      Mental Status: He is alert and oriented to person, place, and time.      Cranial Nerves: No cranial nerve deficit.   Psychiatric:         Mood and Affect: Mood and affect normal.         " Behavior: Behavior normal.         Thought Content: Thought content normal.         Judgment: Judgment normal.       Result Review :    The following data was reviewed by LILIANA Martins on 08/18/23 at 08:16 EDT:        No Images in the past 120 days found..             Assessment and Plan    Diagnoses and all orders for this visit:    1. Encounter for medical examination to establish care (Primary)    2. Annual physical exam  -     CBC & Differential  -     Comprehensive Metabolic Panel  -     Lipid Panel  -     TSH Rfx On Abnormal To Free T4    3. Primary hypertension    4. Iron deficiency  -     Iron Profile  -     Ferritin    5. B12 deficiency  -     Vitamin B12  -     Folate    Other orders  -     olmesartan-hydrochlorothiazide (Benicar HCT) 20-12.5 MG per tablet; Take 1 tablet by mouth Daily.  Dispense: 30 tablet; Refill: 11       Hypertension  - Benicar will be decreased to a lower dose. He was recommended to check his blood pressure at least once daily. If it reads less than 100/60 mmHg, then he will not take his medication.     General examination  - Laboratory testing will be ordered to check routine tests such as CBC, kidney function, liver function, electrolytes, a lipid panel, thyroid panel, iron profile, and B12 levels.     Congestion   - He was advised to take an over-the-counter antihistamine.     He will follow up in 1 month.         Smoking Cessation:    Julio LELO Vines  reports that he quit smoking about 2 weeks ago. His smoking use included cigarettes. He has a 7.50 pack-year smoking history. He has never used smokeless tobacco.            Follow Up   Return in about 1 month (around 9/16/2023) for Next scheduled follow up.  Patient was given instructions and counseling regarding his condition or for health maintenance advice. Please see specific information pulled into the AVS if appropriate.     Please note that portions of this note were completed with a voice recognition  program.    Transcribed from ambient dictation for LILIANA Martins by Marce Bailey.  08/16/23   16:30 EDT    Patient or patient representative verbalized consent to the visit recording.  I have personally performed the services described in this document as transcribed by the above individual, and it is both accurate and complete.

## 2023-08-21 ENCOUNTER — TELEPHONE (OUTPATIENT)
Dept: FAMILY MEDICINE CLINIC | Facility: CLINIC | Age: 58
End: 2023-08-21
Payer: COMMERCIAL

## 2023-08-21 NOTE — TELEPHONE ENCOUNTER
Patient is returning call to office for lab results. Emilee call him back at earliest convenience. [phone]

## 2023-09-29 ENCOUNTER — OFFICE VISIT (OUTPATIENT)
Dept: FAMILY MEDICINE CLINIC | Facility: CLINIC | Age: 58
End: 2023-09-29
Payer: COMMERCIAL

## 2023-09-29 VITALS
TEMPERATURE: 97.9 F | BODY MASS INDEX: 25.62 KG/M2 | SYSTOLIC BLOOD PRESSURE: 110 MMHG | HEIGHT: 71 IN | OXYGEN SATURATION: 97 % | WEIGHT: 183 LBS | DIASTOLIC BLOOD PRESSURE: 70 MMHG | HEART RATE: 69 BPM

## 2023-09-29 DIAGNOSIS — E78.00 HYPERCHOLESTEROLEMIA: ICD-10-CM

## 2023-09-29 DIAGNOSIS — I10 PRIMARY HYPERTENSION: Primary | ICD-10-CM

## 2023-09-29 NOTE — PROGRESS NOTES
Chief Complaint  Chief Complaint   Patient presents with    Hypertension       Subjective      Julio Vines presents to [facility]  The patient,  1965 presents for follow-up on hypertension.    Patient was last seen he was taking Benicar HCT 40-12.5 and was experiencing occasional low blood pressures so he only took this on an as-needed basis.  Dose was decreased at last visit and patient states he is taking his hypertension medication every day. He has been monitoring his blood pressure at home. It has been 120/80 mmHg. He discontinued B12 and iron supplements. He is taking fish oil daily.      Objective     Medical History:  Past Medical History:   Diagnosis Date    HBP (high blood pressure)      Past Surgical History:   Procedure Laterality Date    COLONOSCOPY      [facility]    COLONOSCOPY N/A 2022    Procedure: COLONOSCOPY;  Surgeon: Jayant Snider MD;  Location: [facility] ENDOSCOPY;  Service: General;  Laterality: N/A;  normal       Social History     Tobacco Use    Smoking status: Former     Packs/day: 0.25     Years: 30.00     Pack years: 7.50     Types: Cigarettes     Quit date: 2023     Years since quittin.1    Smokeless tobacco: Never    Tobacco comments:     started smoking at age 20, smoked cigs 5 per day    Vaping Use    Vaping Use: Never used   Substance Use Topics    Alcohol use: Not Currently     Comment: drinks weekly, beer     Drug use: Never     Family History   Problem Relation Age of Onset    Colon cancer Sister     Josef Hyperthermia Neg Hx        Medications:  Prior to Admission medications    Medication Sig Start Date End Date Taking? Authorizing Provider   ferrous sulfate 325 (65 FE) MG tablet Take 1 tablet by mouth Daily With Breakfast.   Yes Provider, MD Kat   olmesartan-hydrochlorothiazide (Benicar HCT) 20-12.5 MG per tablet Take 1 tablet by mouth Daily. 8/16/23 8/15/24 Yes Naz Rose APRN  "  Omega-3 Fatty Acids (fish oil) 1000 MG capsule capsule Take 1 capsule by mouth Daily.   Yes Provider, Kat, MD        Allergies:   [place] [fish oil] and Tuna oil    Health Maintenance Due   Topic Date Due    BMI FOLLOWUP  Never done    INFLUENZA VACCINE  Never done         Vital Signs:   /70 (BP Location: Left arm, Patient Position: Sitting, Cuff Size: Adult)   Pulse 69   Temp 97.9 °F (36.6 °C) (Oral)   Ht 180.3 cm (71\")   Wt 83 kg (183 lb)   SpO2 97%   BMI 25.52 kg/m²     Wt Readings from Last 3 Encounters:   09/29/23 83 kg (183 lb)   08/16/23 78.5 kg (173 lb)   12/19/22 77.2 kg (170 lb 3.1 oz)     BP Readings from Last 3 Encounters:   09/29/23 110/70   08/16/23 124/80   12/19/22 120/86       Physical Exam  Vitals reviewed.   Constitutional:       Appearance: Normal appearance. He is well-developed.   HENT:      Head: Normocephalic and atraumatic.   Eyes:      Conjunctiva/sclera: Conjunctivae normal.      Pupils: Pupils are equal, round, and reactive to light.   Cardiovascular:      Rate and Rhythm: Normal rate and regular rhythm.      Heart sounds: No murmur heard.    No friction rub. No gallop.   Pulmonary:      Effort: Pulmonary effort is normal.      Breath sounds: Normal breath sounds. No wheezing or rhonchi.   Abdominal:      General: Bowel sounds are normal. There is no distension.      Palpations: Abdomen is soft.      Tenderness: There is no abdominal tenderness.   Skin:     General: Skin is warm and dry.   Neurological:      Mental Status: He is alert and oriented to person, place, and time.      Cranial Nerves: No cranial nerve deficit.   Psychiatric:         Mood and Affect: Mood and affect normal.         Behavior: Behavior normal.         Thought Content: Thought content normal.         Judgment: Judgment normal.       Result Review :    The following data was reviewed by LILIANA Martins on 09/29/23 at 12:31 EDT:    Common labs          1/13/2023    11:48 8/16/2023    " 14:07   Common Labs   Glucose  89    BUN  18    Creatinine  0.95    Sodium  140    Potassium  4.7    Chloride  103    Calcium  9.6    Albumin  4.6    Total Bilirubin  0.2    Alkaline Phosphatase  82    AST (SGOT)  23    ALT (SGPT)  26    WBC 6.96     7.32    Hemoglobin 13.7     13.6    Hematocrit 40.0     39.6    Platelets 306     347    Total Cholesterol  177    Triglycerides  72    HDL Cholesterol  49    LDL Cholesterol   114       Details          This result is from an external source.               No Images in the past 120 days found..               Assessment and Plan    Diagnoses and all orders for this visit:    1. Primary hypertension (Primary)    2. Hypercholesterolemia       Hypertension  Continue current regimen.     Hypercholesterolemia  Continue fish oil and make dietary changes.        Smoking Cessation:    Julio Vines  reports that he quit smoking about 8 weeks ago. His smoking use included cigarettes. He has a 7.50 pack-year smoking history. He has never used smokeless tobacco.            Follow Up   Return in about 6 months (around 3/29/2024) for Next scheduled follow up.  Patient was given instructions and counseling regarding his condition or for health maintenance advice. Please see specific information pulled into the AVS if appropriate.     Please note that portions of this note were completed with a voice recognition program.    Transcribed from ambient dictation for LILIANA Martins by Deborah Bush.  09/29/23   11:47 EDT    Patient or patient representative verbalized consent to the visit recording.  I have personally performed the services described in this document as transcribed by the above individual, and it is both accurate and complete.

## 2024-03-29 ENCOUNTER — OFFICE VISIT (OUTPATIENT)
Dept: FAMILY MEDICINE CLINIC | Facility: CLINIC | Age: 59
End: 2024-03-29
Payer: COMMERCIAL

## 2024-03-29 VITALS
DIASTOLIC BLOOD PRESSURE: 84 MMHG | TEMPERATURE: 97.9 F | BODY MASS INDEX: 26.04 KG/M2 | OXYGEN SATURATION: 97 % | SYSTOLIC BLOOD PRESSURE: 152 MMHG | WEIGHT: 186 LBS | HEART RATE: 80 BPM | HEIGHT: 71 IN

## 2024-03-29 DIAGNOSIS — I10 PRIMARY HYPERTENSION: Primary | ICD-10-CM

## 2024-03-29 DIAGNOSIS — L91.8 SKIN TAG: ICD-10-CM

## 2024-03-29 DIAGNOSIS — E78.00 HYPERCHOLESTEROLEMIA: ICD-10-CM

## 2024-03-29 PROCEDURE — 99214 OFFICE O/P EST MOD 30 MIN: CPT

## 2024-03-29 NOTE — PROGRESS NOTES
Chief Complaint  Chief Complaint   Patient presents with    Hypertension    Hyperlipidemia       Subjective      Julio Vines presents to [facility]  The patient is a 58-year-old male who comes in today for a follow-up visit on hypertension and hypercholesterolemia. His blood pressure is elevated today at 152/84 mmHg. He is prescribed Benicar 20/12.5 mg daily and a fish oil supplement daily.    The patient is doing well; there are no recent changes. His blood pressure has been stable. He usually rechecks his blood pressure if he has an elevated reading after he has some rest. He monitors his blood pressure at home regularly. He takes Benicar half the time, and he usually takes it if his reading is high at around 140/90 mmHg. He has been feeling well. He has not consumed any alcohol since 2023. He has not taken Benicar consistently for several days in a row and has seen a difference in his blood pressure. He checks his blood pressure before taking Benicar. He believes that the elevation in his blood pressure is mostly related to his drinking habits and stress. He quit smoking as well.     The patient has a skin tag in his left inguinal area, which is irritating sometimes. He is unsure what he wants to do with it yet.    Objective     Medical History:  Past Medical History:   Diagnosis Date    HBP (high blood pressure)      Past Surgical History:   Procedure Laterality Date    COLONOSCOPY      [facility]    COLONOSCOPY N/A 2022    Procedure: COLONOSCOPY;  Surgeon: Jayant Snider MD;  Location: [facility] ENDOSCOPY;  Service: General;  Laterality: N/A;  normal       Social History     Tobacco Use    Smoking status: Former     Current packs/day: 0.00     Average packs/day: 0.3 packs/day for 30.0 years (7.5 ttl pk-yrs)     Types: Cigarettes     Start date: 1993     Quit date: 2023     Years since quittin.6    Smokeless tobacco: Never    Tobacco  "comments:     started smoking at age 20, smoked cigs 5 per day    Vaping Use    Vaping status: Never Used   Substance Use Topics    Alcohol use: Not Currently     Comment: drinks weekly, beer     Drug use: Never     Family History   Problem Relation Age of Onset    Colon cancer Sister     Malautumn Hyperthermia Neg Hx        Medications:  Prior to Admission medications    Medication Sig Start Date End Date Taking? Authorizing Provider   olmesartan-hydrochlorothiazide (Benicar HCT) 20-12.5 MG per tablet Take 1 tablet by mouth Daily. 8/16/23 8/15/24 Yes Naz Rose APRN   Omega-3 Fatty Acids (fish oil) 1000 MG capsule capsule Take 1 capsule by mouth Daily.   Yes Provider, Kat, MD        Allergies:   [place] [fish oil] and Tuna oil    Health Maintenance Due   Topic Date Due    COVID-19 Vaccine (1 - 2023-24 season) Never done         Vital Signs:   /84 (BP Location: Left arm, Patient Position: Sitting, Cuff Size: Adult)   Pulse 80   Temp 97.9 °F (36.6 °C) (Oral)   Ht 180.3 cm (71\")   Wt 84.4 kg (186 lb)   SpO2 97%   BMI 25.94 kg/m²     Wt Readings from Last 3 Encounters:   03/29/24 84.4 kg (186 lb)   09/29/23 83 kg (183 lb)   08/16/23 78.5 kg (173 lb)     BP Readings from Last 3 Encounters:   03/29/24 152/84   09/29/23 110/70   08/16/23 124/80     Physical Exam  Vitals reviewed.   Constitutional:       Appearance: Normal appearance. He is well-developed.   HENT:      Head: Normocephalic and atraumatic.   Eyes:      Conjunctiva/sclera: Conjunctivae normal.      Pupils: Pupils are equal, round, and reactive to light.   Cardiovascular:      Rate and Rhythm: Normal rate and regular rhythm.      Heart sounds: No murmur heard.     No friction rub. No gallop.   Pulmonary:      Effort: Pulmonary effort is normal.      Breath sounds: Normal breath sounds. No wheezing or rhonchi.   Abdominal:      General: Bowel sounds are normal. There is no distension.      Palpations: Abdomen is soft.      Tenderness: " There is no abdominal tenderness.   Skin:     General: Skin is warm and dry.             Comments: Small skin tag to left groin   Neurological:      Mental Status: He is alert and oriented to person, place, and time.      Cranial Nerves: No cranial nerve deficit.   Psychiatric:         Mood and Affect: Mood and affect normal.         Behavior: Behavior normal.         Thought Content: Thought content normal.         Judgment: Judgment normal.         Result Review :    The following data was reviewed by LILIANA Martins on 03/29/24 at 14:23 EDT:    Common labs          8/16/2023    14:07   Common Labs   Glucose 89    BUN 18    Creatinine 0.95    Sodium 140    Potassium 4.7    Chloride 103    Calcium 9.6    Albumin 4.6    Total Bilirubin 0.2    Alkaline Phosphatase 82    AST (SGOT) 23    ALT (SGPT) 26    WBC 7.32    Hemoglobin 13.6    Hematocrit 39.6    Platelets 347    Total Cholesterol 177    Triglycerides 72    HDL Cholesterol 49    LDL Cholesterol  114        No Images in the past 120 days found..               Assessment and Plan    Diagnoses and all orders for this visit:    1. Primary hypertension (Primary)    2. Hypercholesterolemia    3. Skin tag       Hypertension  The patient was encouraged to take Benicar daily for a few weeks and monitor his blood pressure regularly to evaluate how it is trending. He was informed that he can take it daily as long as his systolic blood pressure is not below 100 mmHg.     Hypercholesterolemia  Continue dietary modifications of decreasing fatty foods, increasing high-fiber foods such as fresh fruits, fresh vegetables, continue regular use of fish oil.  Labs to be reevaluated at time of annual physical.    Skin tag  The patient was informed that it is common to have skin tags in areas of friction. He was advised that if it bothers him, it can be removed in clinic.     General health maintenance  I will check his laboratory work on his next visit during his annual  physical examination.        Smoking Cessation:    Julio Vines  reports that he quit smoking about 7 months ago. His smoking use included cigarettes. He started smoking about 30 years ago. He has a 7.5 pack-year smoking history. He has never used smokeless tobacco.      Follow Up   Return in about 6 months (around 9/29/2024) for Annual physical, Next scheduled follow up.  Patient was given instructions and counseling regarding his condition or for health maintenance advice. Please see specific information pulled into the AVS if appropriate.     Please note that portions of this note were completed with a voice recognition program.      Transcribed from ambient dictation for LILIANA Martins by Kevin Chu.  03/29/24   13:56 EDT    Patient or patient representative verbalized consent to the visit recording.  I have personally performed the services described in this document as transcribed by the above individual, and it is both accurate and complete.

## 2024-07-02 RX ORDER — OLMESARTAN MEDOXOMIL AND HYDROCHLOROTHIAZIDE 20/12.5 20; 12.5 MG/1; MG/1
1 TABLET ORAL DAILY
Qty: 90 TABLET | Refills: 1 | Status: SHIPPED | OUTPATIENT
Start: 2024-07-02 | End: 2025-07-02

## 2024-10-01 ENCOUNTER — OFFICE VISIT (OUTPATIENT)
Dept: FAMILY MEDICINE CLINIC | Facility: CLINIC | Age: 59
End: 2024-10-01
Payer: COMMERCIAL

## 2024-10-01 VITALS
HEART RATE: 77 BPM | HEIGHT: 71 IN | BODY MASS INDEX: 26.77 KG/M2 | WEIGHT: 191.2 LBS | DIASTOLIC BLOOD PRESSURE: 72 MMHG | SYSTOLIC BLOOD PRESSURE: 136 MMHG | OXYGEN SATURATION: 98 % | TEMPERATURE: 98.1 F

## 2024-10-01 DIAGNOSIS — I10 PRIMARY HYPERTENSION: ICD-10-CM

## 2024-10-01 DIAGNOSIS — Z00.00 ANNUAL PHYSICAL EXAM: Primary | ICD-10-CM

## 2024-10-01 DIAGNOSIS — N52.9 ERECTILE DYSFUNCTION, UNSPECIFIED ERECTILE DYSFUNCTION TYPE: ICD-10-CM

## 2024-10-01 DIAGNOSIS — Z12.5 PROSTATE CANCER SCREENING: ICD-10-CM

## 2024-10-01 DIAGNOSIS — E78.00 HYPERCHOLESTEROLEMIA: ICD-10-CM

## 2024-10-01 LAB
ALBUMIN SERPL-MCNC: 4.9 G/DL (ref 3.5–5.2)
ALBUMIN/GLOB SERPL: 2 G/DL
ALP SERPL-CCNC: 120 U/L (ref 39–117)
ALT SERPL W P-5'-P-CCNC: 15 U/L (ref 1–41)
ANION GAP SERPL CALCULATED.3IONS-SCNC: 11.9 MMOL/L (ref 5–15)
AST SERPL-CCNC: 26 U/L (ref 1–40)
BASOPHILS # BLD AUTO: 0.12 10*3/MM3 (ref 0–0.2)
BASOPHILS NFR BLD AUTO: 1.1 % (ref 0–1.5)
BILIRUB SERPL-MCNC: <0.2 MG/DL (ref 0–1.2)
BUN SERPL-MCNC: 19 MG/DL (ref 6–20)
BUN/CREAT SERPL: 19.6 (ref 7–25)
CALCIUM SPEC-SCNC: 10.1 MG/DL (ref 8.6–10.5)
CHLORIDE SERPL-SCNC: 100 MMOL/L (ref 98–107)
CHOLEST SERPL-MCNC: 218 MG/DL (ref 0–200)
CO2 SERPL-SCNC: 25.1 MMOL/L (ref 22–29)
CREAT SERPL-MCNC: 0.97 MG/DL (ref 0.76–1.27)
DEPRECATED RDW RBC AUTO: 46 FL (ref 37–54)
EGFRCR SERPLBLD CKD-EPI 2021: 89.9 ML/MIN/1.73
EOSINOPHIL # BLD AUTO: 0.19 10*3/MM3 (ref 0–0.4)
EOSINOPHIL NFR BLD AUTO: 1.8 % (ref 0.3–6.2)
ERYTHROCYTE [DISTWIDTH] IN BLOOD BY AUTOMATED COUNT: 13.6 % (ref 12.3–15.4)
GLOBULIN UR ELPH-MCNC: 2.5 GM/DL
GLUCOSE SERPL-MCNC: 69 MG/DL (ref 65–99)
HCT VFR BLD AUTO: 41.1 % (ref 37.5–51)
HDLC SERPL-MCNC: 73 MG/DL (ref 40–60)
HGB BLD-MCNC: 13.8 G/DL (ref 13–17.7)
IMM GRANULOCYTES # BLD AUTO: 0.04 10*3/MM3 (ref 0–0.05)
IMM GRANULOCYTES NFR BLD AUTO: 0.4 % (ref 0–0.5)
LDLC SERPL CALC-MCNC: 127 MG/DL (ref 0–100)
LDLC/HDLC SERPL: 1.7 {RATIO}
LYMPHOCYTES # BLD AUTO: 2.82 10*3/MM3 (ref 0.7–3.1)
LYMPHOCYTES NFR BLD AUTO: 26.2 % (ref 19.6–45.3)
MCH RBC QN AUTO: 31.2 PG (ref 26.6–33)
MCHC RBC AUTO-ENTMCNC: 33.6 G/DL (ref 31.5–35.7)
MCV RBC AUTO: 92.8 FL (ref 79–97)
MONOCYTES # BLD AUTO: 1.02 10*3/MM3 (ref 0.1–0.9)
MONOCYTES NFR BLD AUTO: 9.5 % (ref 5–12)
NEUTROPHILS NFR BLD AUTO: 6.58 10*3/MM3 (ref 1.7–7)
NEUTROPHILS NFR BLD AUTO: 61 % (ref 42.7–76)
NRBC BLD AUTO-RTO: 0 /100 WBC (ref 0–0.2)
PLATELET # BLD AUTO: 399 10*3/MM3 (ref 140–450)
PMV BLD AUTO: 10.3 FL (ref 6–12)
POTASSIUM SERPL-SCNC: 4 MMOL/L (ref 3.5–5.2)
PROT SERPL-MCNC: 7.4 G/DL (ref 6–8.5)
PSA SERPL-MCNC: 0.57 NG/ML (ref 0–4)
RBC # BLD AUTO: 4.43 10*6/MM3 (ref 4.14–5.8)
SODIUM SERPL-SCNC: 137 MMOL/L (ref 136–145)
TRIGL SERPL-MCNC: 103 MG/DL (ref 0–150)
TSH SERPL DL<=0.05 MIU/L-ACNC: 0.78 UIU/ML (ref 0.27–4.2)
VLDLC SERPL-MCNC: 18 MG/DL (ref 5–40)
WBC NRBC COR # BLD AUTO: 10.77 10*3/MM3 (ref 3.4–10.8)

## 2024-10-01 PROCEDURE — G0103 PSA SCREENING: HCPCS

## 2024-10-01 PROCEDURE — 36415 COLL VENOUS BLD VENIPUNCTURE: CPT

## 2024-10-01 PROCEDURE — 84443 ASSAY THYROID STIM HORMONE: CPT

## 2024-10-01 PROCEDURE — 80061 LIPID PANEL: CPT

## 2024-10-01 PROCEDURE — 85025 COMPLETE CBC W/AUTO DIFF WBC: CPT

## 2024-10-01 PROCEDURE — 99214 OFFICE O/P EST MOD 30 MIN: CPT

## 2024-10-01 PROCEDURE — 99396 PREV VISIT EST AGE 40-64: CPT

## 2024-10-01 PROCEDURE — 80053 COMPREHEN METABOLIC PANEL: CPT

## 2024-10-01 NOTE — PROGRESS NOTES
Chief Complaint  Chief Complaint   Patient presents with    Annual Exam    Hypertension       Subjective      Julio Vines presents to [facility]  History of Present Illness  The patient is a 59-year-old male who presents today for an annual physical exam, follow-up on hypertension and hypercholesterolemia, and to discuss erectile dysfunction. He is requesting a referral to urology for further evaluation.    He has been experiencing erectile dysfunction for some time and has previously consulted with a [facility]. He underwent shock wave therapy twice a week for six weeks, totaling 12 treatments, but found the treatment painful and ineffective. He was also prescribed Affirm, which contains a medication to aid blood flow, but did not notice any improvement. He was advised to use a penile pump and perform Kegel exercises at home, but found these difficult to do without an erection. He has tried Viagra and Sildenafil, which were effective but caused side effects such as headaches. He has not had his testosterone levels checked, but his prostate has been examined in the past.    He is currently taking olmesartan-hydrochlorothiazide 20/12.5 daily for hypertension. However, he does not take his blood pressure medication consistently. If his blood pressure is around 128 or 130/85, he refrains from taking it, fearing it may lower his blood pressure too much. His blood pressure reading today was 136/72, and he took his medication yesterday when it was 136/90.    SOCIAL HISTORY  He is not smoking.      Objective     Medical History:  Past Medical History:   Diagnosis Date    HBP (high blood pressure)      Past Surgical History:   Procedure Laterality Date    COLONOSCOPY  2017    [facility]    COLONOSCOPY N/A 12/19/2022    Procedure: COLONOSCOPY;  Surgeon: Jayant Snider MD;  Location: [facility] ENDOSCOPY;  Service: General;  Laterality: N/A;  normal       Social History  "    Tobacco Use    Smoking status: Former     Current packs/day: 0.00     Average packs/day: 0.3 packs/day for 30.0 years (7.5 ttl pk-yrs)     Types: Cigarettes     Start date: 1993     Quit date: 2023     Years since quittin.1    Smokeless tobacco: Never    Tobacco comments:     started smoking at age 20, smoked cigs 5 per day    Vaping Use    Vaping status: Never Used   Substance Use Topics    Alcohol use: Not Currently     Comment: drinks weekly, beer     Drug use: Never     Family History   Problem Relation Age of Onset    Colon cancer Sister     Josef Hyperthermia Neg Hx        Medications:  Prior to Admission medications    Medication Sig Start Date End Date Taking? Authorizing Provider   olmesartan-hydrochlorothiazide (BENICAR HCT) 20-12.5 MG per tablet TAKE 1 TABLET BY MOUTH DAILY 24 Yes Naz Rose APRN   Omega-3 Fatty Acids (fish oil) 1000 MG capsule capsule Take 1 capsule by mouth Daily.   Yes Provider, Historical, MD        Allergies:   [place] [fish oil] and Tuna oil    Health Maintenance Due   Topic Date Due    ANNUAL PHYSICAL  2024    LIPID PANEL  2024    COVID-19 Vaccine ( season) Never done         Vital Signs:   /72 (BP Location: Left arm, Patient Position: Sitting, Cuff Size: Adult)   Pulse 77   Temp 98.1 °F (36.7 °C) (Oral)   Ht 180.3 cm (71\")   Wt 86.7 kg (191 lb 3.2 oz)   SpO2 98%   BMI 26.67 kg/m²     Wt Readings from Last 3 Encounters:   10/01/24 86.7 kg (191 lb 3.2 oz)   24 84.4 kg (186 lb)   23 83 kg (183 lb)     BP Readings from Last 3 Encounters:   10/01/24 136/72   24 152/84   23 110/70       Physical Exam  Vitals reviewed.   Constitutional:       Appearance: Normal appearance. He is well-developed.   HENT:      Head: Normocephalic and atraumatic.   Eyes:      Conjunctiva/sclera: Conjunctivae normal.      Pupils: Pupils are equal, round, and reactive to light.   Cardiovascular:      Rate and " Rhythm: Normal rate and regular rhythm.      Heart sounds: No murmur heard.     No friction rub. No gallop.   Pulmonary:      Effort: Pulmonary effort is normal.      Breath sounds: Normal breath sounds. No wheezing or rhonchi.   Abdominal:      General: Bowel sounds are normal. There is no distension.      Palpations: Abdomen is soft.      Tenderness: There is no abdominal tenderness.   Skin:     General: Skin is warm and dry.   Neurological:      Mental Status: He is alert and oriented to person, place, and time.      Cranial Nerves: No cranial nerve deficit.   Psychiatric:         Mood and Affect: Mood and affect normal.         Behavior: Behavior normal.         Thought Content: Thought content normal.         Judgment: Judgment normal.       Physical Exam      Result Review :    The following data was reviewed by LILIANA Martins on 10/01/24 at 11:36 EDT:        No Images in the past 120 days found..    Results                 Assessment and Plan    Diagnoses and all orders for this visit:    1. Annual physical exam (Primary)  -     CBC & Differential  -     Comprehensive Metabolic Panel  -     Lipid Panel  -     TSH Rfx On Abnormal To Free T4    2. Primary hypertension  -     CBC & Differential  -     Comprehensive Metabolic Panel  -     Lipid Panel    3. Hypercholesterolemia    4. Prostate cancer screening  -     PSA SCREENING    5. Erectile dysfunction, unspecified erectile dysfunction type  -     Ambulatory Referral to Urology       Assessment & Plan  1. Erectile Dysfunction.  The patient reports ongoing erectile dysfunction despite undergoing shock wave therapy and taking over-the-counter supplements like Affirm. He has tried Sildenafil but experienced undesirable side effects such as headaches. A referral to urology will be made for further evaluation and management. The urologist will be informed of the treatments he has already undergone, including the shock wave therapy and Sildenafil use.  The patient is advised to continue treatments as recommended by the men's clinic.    2. Hypertension.  His blood pressure today is 136/72, which is an improvement from his previous reading of 152/84 in March of this year. He is currently on olmesartan-hydrochlorothiazide 20-12.5 mg daily. He admits to not taking his medication consistently, especially when his blood pressure readings are lower. He is advised to take his medication daily as prescribed to maintain stable blood pressure levels. Routine labs will be ordered to monitor his condition.    3. Hypercholesterolemia.  Routine labs will be ordered to monitor his cholesterol levels. He is advised to continue his current medication regimen and maintain a healthy diet and exercise routine.    4. Health Maintenance.  A prostate-specific antigen (PSA) test will be performed today as part of prostate cancer screening. Routine annual labs will also be conducted.            Smoking Cessation:    Julio Vines  reports that he quit smoking about 14 months ago. His smoking use included cigarettes. He started smoking about 31 years ago. He has a 7.5 pack-year smoking history. He has never used smokeless tobacco.       Follow Up   Return in about 6 months (around 4/1/2025) for Next scheduled follow up.  Patient was given instructions and counseling regarding his condition or for health maintenance advice. Please see specific information pulled into the AVS if appropriate.     Please note that portions of this note were completed with a voice recognition program.    Patient or patient representative verbalized consent for the use of Ambient Listening during the visit with  LILIANA Martins for chart documentation. 10/1/2024  09:59 EDT

## 2024-10-08 ENCOUNTER — TELEPHONE (OUTPATIENT)
Dept: FAMILY MEDICINE CLINIC | Facility: CLINIC | Age: 59
End: 2024-10-08
Payer: COMMERCIAL

## 2024-10-08 NOTE — TELEPHONE ENCOUNTER
Unsuccessful contact with pt attempting to conduct pt rounding    Was at dialysis today from 09 to 13 and went home shortly after with no difficulty. Did not feel short of breath, but when went to have bloodwork done was noted to have low o2 sats

## 2024-12-06 ENCOUNTER — OFFICE VISIT (OUTPATIENT)
Dept: UROLOGY | Age: 59
End: 2024-12-06
Payer: COMMERCIAL

## 2024-12-06 VITALS
HEIGHT: 71 IN | WEIGHT: 190 LBS | SYSTOLIC BLOOD PRESSURE: 144 MMHG | HEART RATE: 76 BPM | DIASTOLIC BLOOD PRESSURE: 91 MMHG | OXYGEN SATURATION: 98 % | BODY MASS INDEX: 26.6 KG/M2

## 2024-12-06 DIAGNOSIS — N52.9 ERECTILE DYSFUNCTION, UNSPECIFIED ERECTILE DYSFUNCTION TYPE: Primary | ICD-10-CM

## 2024-12-06 RX ORDER — TADALAFIL 5 MG/1
TABLET ORAL
Qty: 90 TABLET | Refills: 3 | Status: SHIPPED | OUTPATIENT
Start: 2024-12-06

## 2024-12-06 NOTE — PROGRESS NOTES
Chief Complaint: Erectile Dysfunction    Subjective         History of Present Illness  Julio Vines is a 59 y.o. male presents to [facility] to be seen for erectile dysfunction.    He has had issues with ED for 7-10 years.     He states that he had been to [facility] and had acoustic wave therapy.    He did also take an herbal supplement called affirm from the mens clinic.    Amara last visit was last Friday and he has been undergoing this for the last 6 months.    He has had no affect from this.     He states that he has tried sildenafil but he had some dizziness and headaches from this.    Objective     Past Medical History:   Diagnosis Date    HBP (high blood pressure)        Past Surgical History:   Procedure Laterality Date    COLONOSCOPY  2017    [facility]    COLONOSCOPY N/A 2022    Procedure: COLONOSCOPY;  Surgeon: Jayant Sndier MD;  Location: [facility] ENDOSCOPY;  Service: General;  Laterality: N/A;  normal          Current Outpatient Medications:     olmesartan-hydrochlorothiazide (BENICAR HCT) 20-12.5 MG per tablet, TAKE 1 TABLET BY MOUTH DAILY, Disp: 90 tablet, Rfl: 1    tadalafil (CIALIS) 5 MG tablet, 1-4 tabs as needed 1 hour prior to intercourse not to exceed 20 mg in a dose, Disp: 90 tablet, Rfl: 3    Allergies   Allergen Reactions    [place] [Fish Oil] Diarrhea    Tuna Oil Diarrhea        Family History   Problem Relation Age of Onset    Colon cancer Sister     Malig Hyperthermia Neg Hx        Social History     Socioeconomic History    Marital status:    Tobacco Use    Smoking status: Former     Current packs/day: 0.00     Average packs/day: 0.3 packs/day for 30.0 years (7.5 ttl pk-yrs)     Types: Cigarettes     Start date: 1993     Quit date: 2023     Years since quittin.3    Smokeless tobacco: Never    Tobacco comments:     started smoking at age 20, smoked cigs 5 per day    Vaping Use    Vaping status: Never Used  "  Substance and Sexual Activity    Alcohol use: Not Currently     Comment: drinks weekly, beer     Drug use: Never    Sexual activity: Defer       Vital Signs:   /91   Pulse 76   Ht 180.3 cm (70.98\")   Wt 86.2 kg (190 lb)   SpO2 98%   BMI 26.51 kg/m²      Physical Exam     Result Review :   The following data was reviewed by: LILIANA Carrillo on 12/06/2024:  Results for orders placed or performed in visit on 10/01/24   Comprehensive Metabolic Panel    Collection Time: 10/01/24 10:29 AM    Specimen: Arm, Right; Blood   Result Value Ref Range    Glucose 69 65 - 99 mg/dL    BUN 19 6 - 20 mg/dL    Creatinine 0.97 0.76 - 1.27 mg/dL    Sodium 137 136 - 145 mmol/L    Potassium 4.0 3.5 - 5.2 mmol/L    Chloride 100 98 - 107 mmol/L    CO2 25.1 22.0 - 29.0 mmol/L    Calcium 10.1 8.6 - 10.5 mg/dL    Total Protein 7.4 6.0 - 8.5 g/dL    Albumin 4.9 3.5 - 5.2 g/dL    ALT (SGPT) 15 1 - 41 U/L    AST (SGOT) 26 1 - 40 U/L    Alkaline Phosphatase 120 (H) 39 - 117 U/L    Total Bilirubin <0.2 0.0 - 1.2 mg/dL    Globulin 2.5 gm/dL    A/G Ratio 2.0 g/dL    BUN/Creatinine Ratio 19.6 7.0 - 25.0    Anion Gap 11.9 5.0 - 15.0 mmol/L    eGFR 89.9 >60.0 mL/min/1.73   Lipid Panel    Collection Time: 10/01/24 10:29 AM    Specimen: Arm, Right; Blood   Result Value Ref Range    Total Cholesterol 218 (H) 0 - 200 mg/dL    Triglycerides 103 0 - 150 mg/dL    HDL Cholesterol 73 (H) 40 - 60 mg/dL    LDL Cholesterol  127 (H) 0 - 100 mg/dL    VLDL Cholesterol 18 5 - 40 mg/dL    LDL/HDL Ratio 1.70    TSH Rfx On Abnormal To Free T4    Collection Time: 10/01/24 10:29 AM    Specimen: Arm, Right; Blood   Result Value Ref Range    TSH 0.775 0.270 - 4.200 uIU/mL   PSA SCREENING    Collection Time: 10/01/24 10:29 AM    Specimen: Arm, Right; Blood   Result Value Ref Range    PSA 0.573 0.000 - 4.000 ng/mL   CBC Auto Differential    Collection Time: 10/01/24 10:29 AM    Specimen: Arm, Right; Blood   Result Value Ref Range    WBC 10.77 3.40 - 10.80 " 10*3/mm3    RBC 4.43 4.14 - 5.80 10*6/mm3    Hemoglobin 13.8 13.0 - 17.7 g/dL    Hematocrit 41.1 37.5 - 51.0 %    MCV 92.8 79.0 - 97.0 fL    MCH 31.2 26.6 - 33.0 pg    MCHC 33.6 31.5 - 35.7 g/dL    RDW 13.6 12.3 - 15.4 %    RDW-SD 46.0 37.0 - 54.0 fl    MPV 10.3 6.0 - 12.0 fL    Platelets 399 140 - 450 10*3/mm3    Neutrophil % 61.0 42.7 - 76.0 %    Lymphocyte % 26.2 19.6 - 45.3 %    Monocyte % 9.5 5.0 - 12.0 %    Eosinophil % 1.8 0.3 - 6.2 %    Basophil % 1.1 0.0 - 1.5 %    Immature Grans % 0.4 0.0 - 0.5 %    Neutrophils, Absolute 6.58 1.70 - 7.00 10*3/mm3    Lymphocytes, Absolute 2.82 0.70 - 3.10 10*3/mm3    Monocytes, Absolute 1.02 (H) 0.10 - 0.90 10*3/mm3    Eosinophils, Absolute 0.19 0.00 - 0.40 10*3/mm3    Basophils, Absolute 0.12 0.00 - 0.20 10*3/mm3    Immature Grans, Absolute 0.04 0.00 - 0.05 10*3/mm3    nRBC 0.0 0.0 - 0.2 /100 WBC      PSA          10/1/2024    10:29   PSA   PSA 0.573          Procedures        Assessment and Plan    Diagnoses and all orders for this visit:    1. Erectile dysfunction, unspecified erectile dysfunction type (Primary)  -     tadalafil (CIALIS) 5 MG tablet; 1-4 tabs as needed 1 hour prior to intercourse not to exceed 20 mg in a dose  Dispense: 90 tablet; Refill: 3          Will send patient with prescription for tadalafil as he states he has not tried this before.    We did discuss common side effects of tadalafil including dizziness, flushing, headache I did discuss potential for catastrophic hypotension if he was to take nitroglycerin within 48 hours of taking this medication.  Also discussed ER return precautions and if he is with an erection lasting longer than 3 hours he will need to present to the emergency department to be seen.    We did discuss potential ICI and we will send him home with information on this today.    Will plan for a follow-up appointment in 3 months or sooner if needed        I spent 15 minutes caring for Julio on this date of service. This time  includes time spent by me in the following activities:reviewing tests, obtaining and/or reviewing a separately obtained history, performing a medically appropriate examination and/or evaluation , counseling and educating the patient/family/caregiver, ordering medications, tests, or procedures, and documenting information in the medical record  Follow Up   Return in about 3 months (around 3/6/2025) for f/u ED.  Patient was given instructions and counseling regarding his condition or for health maintenance advice. Please see specific information pulled into the AVS if appropriate.         This document has been electronically signed by LILIANA Carrillo  December 6, 2024 10:11 EST

## 2025-01-07 RX ORDER — OLMESARTAN MEDOXOMIL AND HYDROCHLOROTHIAZIDE 20/12.5 20; 12.5 MG/1; MG/1
1 TABLET ORAL DAILY
Qty: 90 TABLET | Refills: 1 | Status: SHIPPED | OUTPATIENT
Start: 2025-01-07 | End: 2026-01-07

## 2025-03-06 ENCOUNTER — TELEPHONE (OUTPATIENT)
Dept: UROLOGY | Age: 60
End: 2025-03-06

## 2025-03-10 NOTE — TELEPHONE ENCOUNTER
2ND CALL TO PT TO RS NO SHOW APPT FROM 3/6 W/MAGDA/ALMA W/PT AND PT SAID THAT HE DOES NOT WANT TO [facility] APPT/ANYTHING ELSE TO DO??

## 2025-04-15 ENCOUNTER — OFFICE VISIT (OUTPATIENT)
Dept: FAMILY MEDICINE CLINIC | Facility: CLINIC | Age: 60
End: 2025-04-15
Payer: COMMERCIAL

## 2025-04-15 VITALS
BODY MASS INDEX: 27.4 KG/M2 | HEIGHT: 71 IN | DIASTOLIC BLOOD PRESSURE: 70 MMHG | WEIGHT: 195.7 LBS | HEART RATE: 79 BPM | OXYGEN SATURATION: 99 % | TEMPERATURE: 98.2 F | SYSTOLIC BLOOD PRESSURE: 130 MMHG

## 2025-04-15 DIAGNOSIS — E78.00 HYPERCHOLESTEROLEMIA: ICD-10-CM

## 2025-04-15 DIAGNOSIS — H93.13 TINNITUS OF BOTH EARS: ICD-10-CM

## 2025-04-15 DIAGNOSIS — N52.9 ERECTILE DYSFUNCTION, UNSPECIFIED ERECTILE DYSFUNCTION TYPE: ICD-10-CM

## 2025-04-15 DIAGNOSIS — I10 PRIMARY HYPERTENSION: Primary | ICD-10-CM

## 2025-04-15 LAB
ALBUMIN SERPL-MCNC: 4.4 G/DL (ref 3.5–5.2)
ALBUMIN/GLOB SERPL: 1.6 G/DL
ALP SERPL-CCNC: 123 U/L (ref 39–117)
ALT SERPL W P-5'-P-CCNC: 9 U/L (ref 1–41)
ANION GAP SERPL CALCULATED.3IONS-SCNC: 9.9 MMOL/L (ref 5–15)
AST SERPL-CCNC: 18 U/L (ref 1–40)
BASOPHILS # BLD MANUAL: 0 10*3/MM3 (ref 0–0.2)
BASOPHILS NFR BLD MANUAL: 0 % (ref 0–1.5)
BILIRUB SERPL-MCNC: <0.2 MG/DL (ref 0–1.2)
BUN SERPL-MCNC: 17 MG/DL (ref 6–20)
BUN/CREAT SERPL: 15.9 (ref 7–25)
CALCIUM SPEC-SCNC: 9.2 MG/DL (ref 8.6–10.5)
CHLORIDE SERPL-SCNC: 105 MMOL/L (ref 98–107)
CHOLEST SERPL-MCNC: 193 MG/DL (ref 0–200)
CO2 SERPL-SCNC: 24.1 MMOL/L (ref 22–29)
CREAT SERPL-MCNC: 1.07 MG/DL (ref 0.76–1.27)
DEPRECATED RDW RBC AUTO: 45.5 FL (ref 37–54)
EGFRCR SERPLBLD CKD-EPI 2021: 79.9 ML/MIN/1.73
EOSINOPHIL # BLD MANUAL: 0.4 10*3/MM3 (ref 0–0.4)
EOSINOPHIL NFR BLD MANUAL: 4.2 % (ref 0.3–6.2)
ERYTHROCYTE [DISTWIDTH] IN BLOOD BY AUTOMATED COUNT: 13.4 % (ref 12.3–15.4)
GLOBULIN UR ELPH-MCNC: 2.7 GM/DL
GLUCOSE SERPL-MCNC: 85 MG/DL (ref 65–99)
HCT VFR BLD AUTO: 40 % (ref 37.5–51)
HDLC SERPL-MCNC: 42 MG/DL (ref 40–60)
HGB BLD-MCNC: 13.3 G/DL (ref 13–17.7)
LDLC SERPL CALC-MCNC: 127 MG/DL (ref 0–100)
LDLC/HDLC SERPL: 2.96 {RATIO}
LYMPHOCYTES # BLD MANUAL: 2.47 10*3/MM3 (ref 0.7–3.1)
LYMPHOCYTES NFR BLD MANUAL: 8.3 % (ref 5–12)
MCH RBC QN AUTO: 31.2 PG (ref 26.6–33)
MCHC RBC AUTO-ENTMCNC: 33.3 G/DL (ref 31.5–35.7)
MCV RBC AUTO: 93.9 FL (ref 79–97)
MONOCYTES # BLD: 0.79 10*3/MM3 (ref 0.1–0.9)
NEUTROPHILS # BLD AUTO: 5.84 10*3/MM3 (ref 1.7–7)
NEUTROPHILS NFR BLD MANUAL: 61.5 % (ref 42.7–76)
PLAT MORPH BLD: NORMAL
PLATELET # BLD AUTO: 407 10*3/MM3 (ref 140–450)
PMV BLD AUTO: 10 FL (ref 6–12)
POTASSIUM SERPL-SCNC: 4.3 MMOL/L (ref 3.5–5.2)
PROT SERPL-MCNC: 7.1 G/DL (ref 6–8.5)
RBC # BLD AUTO: 4.26 10*6/MM3 (ref 4.14–5.8)
RBC MORPH BLD: NORMAL
SODIUM SERPL-SCNC: 139 MMOL/L (ref 136–145)
T-UPTAKE NFR SERPL: 1.06 TBI (ref 0.8–1.3)
T4 SERPL-MCNC: 6.07 MCG/DL (ref 4.5–11.7)
TRIGL SERPL-MCNC: 133 MG/DL (ref 0–150)
TSH SERPL DL<=0.05 MIU/L-ACNC: 0.66 UIU/ML (ref 0.27–4.2)
VARIANT LYMPHS NFR BLD MANUAL: 26 % (ref 19.6–45.3)
VLDLC SERPL-MCNC: 24 MG/DL (ref 5–40)
WBC MORPH BLD: NORMAL
WBC NRBC COR # BLD AUTO: 9.49 10*3/MM3 (ref 3.4–10.8)

## 2025-04-15 PROCEDURE — 84436 ASSAY OF TOTAL THYROXINE: CPT

## 2025-04-15 PROCEDURE — 36415 COLL VENOUS BLD VENIPUNCTURE: CPT

## 2025-04-15 PROCEDURE — 80053 COMPREHEN METABOLIC PANEL: CPT

## 2025-04-15 PROCEDURE — 85027 COMPLETE CBC AUTOMATED: CPT

## 2025-04-15 PROCEDURE — 80061 LIPID PANEL: CPT

## 2025-04-15 PROCEDURE — 85007 BL SMEAR W/DIFF WBC COUNT: CPT

## 2025-04-15 PROCEDURE — 99214 OFFICE O/P EST MOD 30 MIN: CPT

## 2025-04-15 PROCEDURE — 84479 ASSAY OF THYROID (T3 OR T4): CPT

## 2025-04-15 PROCEDURE — 84443 ASSAY THYROID STIM HORMONE: CPT

## 2025-04-15 NOTE — PROGRESS NOTES
Chief Complaint  Chief Complaint   Patient presents with    Hypertension       Subjective      Julio Vines presents to [facility]  History of Present Illness  Patient is today to follow-up on hypertension, hyperlipidemia.      Blood pressure is moderately well-controlled with a current blood pressure 130/70 on exam today.  He is prescribed olmesartan 20/hydrochlorothiazide 12.5 which is prescribed to be taken daily but patient states he only takes it occasionally.  He states the reason he does not take it daily is because he is fearful that he will become hypotensive.  He checks his blood pressure when he gets up in the morning and if his systolic pressure is greater than 120 and his diastolic pressure is greater than 90, then he will take his daily medication.  He denies any episodes of hypotension or related symptoms at home.    Patient has been found to have hyperlipidemia and ASCVD risk score is 7.8%.  He has been hesitant in the past to take any cholesterol-lowering medications.    Patient also has a history of erectile dysfunction.  He has seen a specialist in [place] but had no improvement in his symptoms.  He was referred over to a urologist through [facility] but states he is no longer interested in looking into this as nothing seemed to help in the past.  He continues to take Cialis and says he has plenty.    [facility] was [facility] seen by an E[facility] provider regarding tinnitus and headaches.  He was tested and found to have no hearing loss but bilateral tinnitus.  Given this recent claim, he has been referred over to psychiatry for a psychiatric evaluation.    The 10-year ASCVD risk score (Vicente TATE, et al., 2019) is: 7.8%    Values used to calculate the score:      Age: 59 years      Sex: Male      Is Non- : No      Diabetic: No      Tobacco smoker: No      Systolic Blood Pressure: 130 mmHg      Is BP treated:  "Yes      HDL Cholesterol: 73 mg/dL      Total Cholesterol: 218 mg/dL        Objective     Medical History:  Past Medical History:   Diagnosis Date    HBP (high blood pressure)      Past Surgical History:   Procedure Laterality Date    COLONOSCOPY      [facility]    COLONOSCOPY N/A 2022    Procedure: COLONOSCOPY;  Surgeon: Jayant Snider MD;  Location: [facility] ENDOSCOPY;  Service: General;  Laterality: N/A;  normal       Social History     Tobacco Use    Smoking status: Former     Current packs/day: 0.00     Average packs/day: 0.3 packs/day for 30.0 years (7.5 ttl pk-yrs)     Types: Cigarettes     Start date: 1993     Quit date: 2023     Years since quittin.7    Smokeless tobacco: Never    Tobacco comments:     started smoking at age 20, smoked cigs 5 per day    Vaping Use    Vaping status: Never Used   Substance Use Topics    Alcohol use: Not Currently     Comment: drinks weekly, beer     Drug use: Never     Family History   Problem Relation Age of Onset    Colon cancer Sister     Josef Hyperthermia Neg Hx        Medications:  Prior to Admission medications    Medication Sig Start Date End Date Taking? Authorizing Provider   olmesartan-hydrochlorothiazide (BENICAR HCT) 20-12.5 MG per tablet TAKE 1 TABLET BY MOUTH DAILY 25 Yes Naz Rose APRN   tadalafil (CIALIS) 5 MG tablet 1-4 tabs as needed 1 hour prior to intercourse not to exceed 20 mg in a dose 24  Yes Ximena Schmitz APRN        Allergies:   [place] [fish oil] and Tuna oil    Health Maintenance Due   Topic Date Due    Pneumococcal Vaccine 50+ (1 of 1 - PCV) Never done    COVID-19 Vaccine ( season) Never done         Vital Signs:   /70 (BP Location: Left arm, Patient Position: Sitting, Cuff Size: Adult)   Pulse 79   Temp 98.2 °F (36.8 °C) (Oral)   Ht 180.3 cm (70.98\")   Wt 88.8 kg (195 lb 11.2 oz)   SpO2 99%   BMI 27.31 kg/m²     Wt Readings from Last 3 Encounters: "   04/15/25 88.8 kg (195 lb 11.2 oz)   12/06/24 86.2 kg (190 lb)   10/01/24 86.7 kg (191 lb 3.2 oz)     BP Readings from Last 3 Encounters:   04/15/25 130/70   12/06/24 144/91   10/01/24 136/72       Physical Exam  Vitals reviewed.   Constitutional:       Appearance: Normal appearance. He is well-developed.   HENT:      Head: Normocephalic and atraumatic.   Eyes:      Conjunctiva/sclera: Conjunctivae normal.      Pupils: Pupils are equal, round, and reactive to light.   Cardiovascular:      Rate and Rhythm: Normal rate and regular rhythm.      Heart sounds: No murmur heard.     No friction rub. No gallop.   Pulmonary:      Effort: Pulmonary effort is normal.      Breath sounds: Normal breath sounds. No wheezing or rhonchi.   Abdominal:      General: Bowel sounds are normal. There is no distension.      Palpations: Abdomen is soft.      Tenderness: There is no abdominal tenderness.   Skin:     General: Skin is warm and dry.   Neurological:      Mental Status: He is alert and oriented to person, place, and time.      Cranial Nerves: No cranial nerve deficit.   Psychiatric:         Mood and Affect: Mood and affect normal.         Behavior: Behavior normal.         Thought Content: Thought content normal.         Judgment: Judgment normal.       Physical Exam        Result Review :    The following data was reviewed by LILIANA Martnis on 04/15/25 at 15:24 EDT:    Common labs          10/1/2024    10:29   Common Labs   Glucose 69    BUN 19    Creatinine 0.97    Sodium 137    Potassium 4.0    Chloride 100    Calcium 10.1    Albumin 4.9    Total Bilirubin <0.2    Alkaline Phosphatase 120    AST (SGOT) 26    ALT (SGPT) 15    WBC 10.77    Hemoglobin 13.8    Hematocrit 41.1    Platelets 399    Total Cholesterol 218    Triglycerides 103    HDL Cholesterol 73    LDL Cholesterol  127    PSA 0.573        No Images in the past 120 days found..    Results                 Assessment and Plan    Diagnoses and all orders  for this visit:    1. Primary hypertension (Primary)  -     CBC With Manual Differential  -     Comprehensive Metabolic Panel  -     Thyroid Panel With TSH  -     Lipid Panel    2. Hypercholesterolemia  -     CBC With Manual Differential  -     Comprehensive Metabolic Panel  -     Lipid Panel    3. Tinnitus of both ears    4. Erectile dysfunction, unspecified erectile dysfunction type       Assessment & Plan    1.  Hypertension.  Blood pressure is moderately well-controlled, but patient is not taking his medication daily.  Patient was instructed to begin regular daily use of medication as prescribed.  He was encouraged to continue monitoring his blood pressure to ensure he does not experience any hypotensive episodes at home.  Patient understands the risks of untreated hypertension.  He is agreeable to start taking the medication daily.    2.  Hyperlipidemia.  Lipids have been elevated in the past with a total cholesterol of 218, LDL of 127, HDL of 73.  Lipid panel will be repeated today and if indicated, cholesterol-lowering medication will be initiated.  He has been educated on lifestyle changes including decreasing the intake of fatty foods, saturated fats, sugars and carbohydrates to decrease the cardiovascular associated risks.  He is a previous smoker but quit less than 2 years ago.    3.  Erectile dysfunction.  Patient does not want to follow-up with urology at this time.  He will continue use of Cialis or Viagra as needed and states he does not need a refill currently.    4.  Tinnitus.  [facility] associated to his time of service.  [facility] and have [facility] evaluation for this recent claim.        Smoking Cessation:    Julio Vines  reports that he quit smoking about 20 months ago. His smoking use included cigarettes. He started smoking about 31 years ago. He has a 7.5 pack-year smoking history. He has never used  smokeless tobacco.             Follow Up   No follow-ups on file.  Patient was given instructions and counseling regarding his condition or for health maintenance advice. Please see specific information pulled into the AVS if appropriate.     Please note that portions of this note were completed with a voice recognition program.    Patient or patient representative verbalized consent for the use of Ambient Listening during the visit with  LILIANA Martins for chart documentation. 4/15/2025  15:21 EDT

## 2025-07-11 RX ORDER — OLMESARTAN MEDOXOMIL AND HYDROCHLOROTHIAZIDE 20/12.5 20; 12.5 MG/1; MG/1
1 TABLET ORAL DAILY
Qty: 90 TABLET | Refills: 1 | Status: SHIPPED | OUTPATIENT
Start: 2025-07-11

## 2025-08-13 PROCEDURE — 99283 EMERGENCY DEPT VISIT LOW MDM: CPT

## 2025-08-14 ENCOUNTER — HOSPITAL ENCOUNTER (EMERGENCY)
Facility: HOSPITAL | Age: 60
Discharge: HOME | End: 2025-08-14
Attending: EMERGENCY MEDICINE
Payer: OTHER MISCELLANEOUS

## 2025-08-14 ENCOUNTER — APPOINTMENT (OUTPATIENT)
Dept: GENERAL RADIOLOGY | Facility: HOSPITAL | Age: 60
End: 2025-08-14
Payer: OTHER MISCELLANEOUS

## 2025-08-14 VITALS
TEMPERATURE: 97.7 F | HEART RATE: 74 BPM | RESPIRATION RATE: 18 BRPM | HEIGHT: 71 IN | WEIGHT: 192.9 LBS | OXYGEN SATURATION: 99 % | SYSTOLIC BLOOD PRESSURE: 146 MMHG | BODY MASS INDEX: 27.01 KG/M2 | DIASTOLIC BLOOD PRESSURE: 92 MMHG
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
## 2025-08-14 DIAGNOSIS — M51.360 DEGENERATION OF INTERVERTEBRAL DISC OF LUMBAR REGION WITH DISCOGENIC BACK PAIN: ICD-10-CM

## 2025-08-14 DIAGNOSIS — M54.50 ACUTE MIDLINE LOW BACK PAIN WITHOUT SCIATICA: Primary | ICD-10-CM

## 2025-08-14 PROCEDURE — 72100 X-RAY EXAM L-S SPINE 2/3 VWS: CPT

## 2025-08-14 PROCEDURE — 96372 THER/PROPH/DIAG INJ SC/IM: CPT

## 2025-08-14 PROCEDURE — 25010000002 KETOROLAC TROMETHAMINE PER 15 MG: Performed by: EMERGENCY MEDICINE

## 2025-08-14 RX ORDER — HYDROCODONE BITARTRATE AND ACETAMINOPHEN 7.5; 325 MG/1; MG/1
1 TABLET ORAL EVERY 6 HOURS PRN
Qty: 12 TABLET | Refills: 0 | Status: SHIPPED | OUTPATIENT
Start: 2025-08-14

## 2025-08-14 RX ORDER — CYCLOBENZAPRINE HCL 10 MG
10 TABLET ORAL 3 TIMES DAILY PRN
Qty: 12 TABLET | Refills: 0 | Status: SHIPPED | OUTPATIENT
Start: 2025-08-14

## 2025-08-14 RX ORDER — HYDROCODONE BITARTRATE AND ACETAMINOPHEN 7.5; 325 MG/1; MG/1
1 TABLET ORAL ONCE
Refills: 0 | Status: COMPLETED | OUTPATIENT
Start: 2025-08-14 | End: 2025-08-14

## 2025-08-14 RX ORDER — KETOROLAC TROMETHAMINE 30 MG/ML
60 INJECTION, SOLUTION INTRAMUSCULAR; INTRAVENOUS ONCE
Status: COMPLETED | OUTPATIENT
Start: 2025-08-14 | End: 2025-08-14

## 2025-08-14 RX ORDER — IBUPROFEN 800 MG/1
800 TABLET, FILM COATED ORAL EVERY 8 HOURS PRN
Qty: 20 TABLET | Refills: 0 | Status: SHIPPED | OUTPATIENT
Start: 2025-08-14

## 2025-08-14 RX ADMIN — HYDROCODONE BITARTRATE AND ACETAMINOPHEN 1 TABLET: 7.5; 325 TABLET ORAL at 06:55

## 2025-08-14 RX ADMIN — KETOROLAC TROMETHAMINE 60 MG: 30 INJECTION, SOLUTION INTRAMUSCULAR at 06:56

## (undated) DEVICE — STERILE POLYISOPRENE POWDER-FREE SURGICAL GLOVES WITH EMOLLIENT COATING: Brand: PROTEXIS

## (undated) DEVICE — Device: Brand: DEFENDO AIR/WATER/SUCTION AND BIOPSY VALVE

## (undated) DEVICE — GLV SURG SENSICARE PI ORTHO SZ8 LF STRL

## (undated) DEVICE — SOL IRRG H2O PL/BG 1000ML STRL

## (undated) DEVICE — GOWN,REINFRCE,POLY,SIRUS,BREATH SLV,XXLG: Brand: MEDLINE

## (undated) DEVICE — CONN JET HYDRA H20 AUXILIARY DISP

## (undated) DEVICE — SOL IRR NACL 0.9PCT BT 1000ML

## (undated) DEVICE — SOLIDIFIER LIQLOC PLS 1500CC BT

## (undated) DEVICE — LINER SURG CANSTR SXN S/RIGD 1500CC

## (undated) DEVICE — Device